# Patient Record
Sex: MALE | Race: BLACK OR AFRICAN AMERICAN | NOT HISPANIC OR LATINO | Employment: FULL TIME | URBAN - METROPOLITAN AREA
[De-identification: names, ages, dates, MRNs, and addresses within clinical notes are randomized per-mention and may not be internally consistent; named-entity substitution may affect disease eponyms.]

---

## 2019-08-19 ENCOUNTER — HOSPITAL ENCOUNTER (EMERGENCY)
Facility: OTHER | Age: 34
Discharge: HOME | End: 2019-08-19
Attending: EMERGENCY MEDICINE
Payer: COMMERCIAL

## 2019-08-19 VITALS
HEIGHT: 72 IN | SYSTOLIC BLOOD PRESSURE: 128 MMHG | RESPIRATION RATE: 19 BRPM | TEMPERATURE: 98 F | BODY MASS INDEX: 36.98 KG/M2 | OXYGEN SATURATION: 96 % | WEIGHT: 273 LBS | HEART RATE: 81 BPM | DIASTOLIC BLOOD PRESSURE: 75 MMHG

## 2019-08-19 DIAGNOSIS — M54.41 ACUTE LOW BACK PAIN WITH RIGHT-SIDED SCIATICA, UNSPECIFIED BACK PAIN LATERALITY: Primary | ICD-10-CM

## 2019-08-19 PROCEDURE — 99284 EMERGENCY DEPT VISIT MOD MDM: CPT | Mod: 25

## 2019-08-19 PROCEDURE — 96372 THER/PROPH/DIAG INJ SC/IM: CPT

## 2019-08-19 PROCEDURE — 63600175 PHARM REV CODE 636 W HCPCS: Performed by: PHYSICIAN ASSISTANT

## 2019-08-19 RX ORDER — HYDROMORPHONE HYDROCHLORIDE 1 MG/ML
1 INJECTION, SOLUTION INTRAMUSCULAR; INTRAVENOUS; SUBCUTANEOUS
Status: COMPLETED | OUTPATIENT
Start: 2019-08-19 | End: 2019-08-19

## 2019-08-19 RX ORDER — LIDOCAINE 50 MG/G
1 PATCH TOPICAL DAILY
Qty: 15 PATCH | Refills: 0 | Status: SHIPPED | OUTPATIENT
Start: 2019-08-19 | End: 2019-11-07

## 2019-08-19 RX ORDER — CYCLOBENZAPRINE HCL 10 MG
10 TABLET ORAL 3 TIMES DAILY PRN
Qty: 15 TABLET | Refills: 0 | Status: SHIPPED | OUTPATIENT
Start: 2019-08-19 | End: 2019-08-24

## 2019-08-19 RX ORDER — DICLOFENAC SODIUM 25 MG/1
25 TABLET, DELAYED RELEASE ORAL 3 TIMES DAILY PRN
Qty: 30 TABLET | Refills: 0 | Status: SHIPPED | OUTPATIENT
Start: 2019-08-19 | End: 2019-11-07

## 2019-08-19 RX ADMIN — HYDROMORPHONE HYDROCHLORIDE 1 MG: 1 INJECTION, SOLUTION INTRAMUSCULAR; INTRAVENOUS; SUBCUTANEOUS at 10:08

## 2019-08-19 NOTE — ED NOTES
Appearance: Pt awake, alert & oriented to person, place & time. Pt in no acute distress at present time. Pt is clean and well groomed with clothes appropriately fastened.   Skin: Skin warm, dry & intact. Color consistent with ethnicity. Mucous membranes moist. No breakdown or brusing noted.   Musculoskeletal: Patient moving all extremities well, no obvious swelling or deformities noted. No TTP to R upper and lower leg. Described pain as shooting pain to R posterior thigh radiates distally. Ambulatory with steady gait.   Respiratory: Respirations spontaneous, even, and non-labored. Visible chest rise noted. Airway is open and patent. No accessory muscle use noted.   Neurologic: Sensation is intact. Speech is clear and appropriate. Eyes open spontaneously, behavior appropriate to situation, follows commands,  purposeful motor response noted.   Cardiac:  No Bilateral lower extremity edema. Cap refill is <3 seconds. Pt denies active chest pains, SOB, dizziness, blurred vision, weakness or fatigue at this time.   Abdomen: Pt denies active abd pains, cramping or discomfort, No N/V/D at this time.   : Pt reports no dysuria or hematuria.

## 2019-08-19 NOTE — ED NOTES
Pt reporting pain is 5/10, reporting improvement after IM medication. Pt sitting upright in chair, wife by his side, playing on cell phone. Pt AAOx4 and appropriate at this time. Respirations even and unlabored. No acute distress noted.

## 2019-08-19 NOTE — ED NOTES
Pt with RLE pain x 2 weeks, was seen at urgent care, given tramadol, ibuprofen, steroids, denies relief of pain despite taking medications. Describes pain as shooting pain to R posterior thigh to R lateral lower leg below knee. Denies injury. Pt AAOx4 and appropriate at this time. Respirations even and unlabored. No acute distress noted.

## 2019-08-19 NOTE — ED PROVIDER NOTES
"Encounter Date: 8/19/2019       History     Chief Complaint   Patient presents with    Back Pain     Back pain radiating down the right leg two weeks ago. Pt was seen at  last week for the same s/s.      34-year-old male with no significant past medical history presents to the emergency department with complaints of lower back pain radiating down the right leg.  He denies any known trauma or injury but states that he does work as a .  He states that he may have strained something while messing around in his truck a couple of weeks ago.  Went to urgent care 1 week ago and received "a shot" and sent home with Tramadol, medrol dose pack, robaxin, and ibuprofen. States it has gotten worse. He reports pain that is 7/10.  He admits that he is taking all the medications as prescribed.  Denies loss of bowel bladder function or saddle paresthesias.  Denies any numbness or weakness.  Denies any urinary symptoms.    The history is provided by the patient and the spouse.     Review of patient's allergies indicates:  No Known Allergies  History reviewed. No pertinent past medical history.  Past Surgical History:   Procedure Laterality Date    MOUTH SURGERY       History reviewed. No pertinent family history.  Social History     Tobacco Use    Smoking status: Never Smoker    Smokeless tobacco: Never Used   Substance Use Topics    Alcohol use: Yes     Comment: occ    Drug use: Never     Comment: denies      Review of Systems   Constitutional: Negative for chills and fever.   HENT: Negative for sore throat.    Respiratory: Negative for shortness of breath.    Cardiovascular: Negative for chest pain.   Gastrointestinal: Negative for nausea and vomiting.   Genitourinary: Negative for difficulty urinating, dysuria, flank pain, frequency, hematuria and urgency.   Musculoskeletal: Positive for back pain. Negative for gait problem and joint swelling.        Right leg pain   Skin: Negative for rash.   Neurological: " Negative for weakness and numbness.   Hematological: Does not bruise/bleed easily.       Physical Exam     Initial Vitals [08/19/19 0904]   BP Pulse Resp Temp SpO2   (!) 159/86 84 18 99 °F (37.2 °C) 98 %      MAP       --         Physical Exam    Nursing note and vitals reviewed.  Constitutional: He appears well-developed and well-nourished. He is not diaphoretic.  Non-toxic appearance. No distress.   HENT:   Head: Normocephalic and atraumatic.   Right Ear: External ear normal.   Left Ear: External ear normal.   Nose: Nose normal.   Eyes: Conjunctivae and lids are normal. No scleral icterus.   Neck: Normal range of motion and phonation normal. Neck supple. No spinous process tenderness and no muscular tenderness present. Normal range of motion present. No neck rigidity.   Cardiovascular: Normal rate, regular rhythm, normal heart sounds, intact distal pulses and normal pulses. Exam reveals no gallop, no friction rub and no decreased pulses.    No murmur heard.  Pulses:       Radial pulses are 2+ on the right side, and 2+ on the left side.        Dorsalis pedis pulses are 2+ on the right side, and 2+ on the left side.   Pulmonary/Chest: Effort normal and breath sounds normal. No respiratory distress. He has no decreased breath sounds. He has no wheezes. He has no rhonchi. He has no rales.   Abdominal: Soft. Normal appearance. He exhibits no distension. There is no tenderness. There is no rebound and no guarding.   Musculoskeletal: Normal range of motion.   No obvious deformities, moving all extremities, normal gait  No midline TTP or step offs to cervical, thoracic or lumbar spine. No paraspinal muscle TTP. FROM of spine without discomfort or pain. No signs of trauma or injury.   Pain with modified straight leg raise bilaterally to the lower back.  Technically negative modified straight leg raise as it did not cause the pain to shoot down his leg.  No bony deformity or bony tenderness palpation. Full range of motion  of the knee and ankle joints. Intact distal pulses with no sensory deficits.  Capillary refill less than 3 sec.   Neurological: He is alert and oriented to person, place, and time. He has normal strength. He displays no atrophy. No sensory deficit. He exhibits normal muscle tone. Gait normal. GCS eye subscore is 4. GCS verbal subscore is 5. GCS motor subscore is 6.   Skin: Skin is warm, dry and intact. Capillary refill takes less than 2 seconds. No abrasion, no bruising, no ecchymosis, no laceration, no lesion, no rash and no abscess noted. No erythema.   Psychiatric: He has a normal mood and affect. His speech is normal and behavior is normal. Judgment normal. Cognition and memory are normal.         ED Course   Procedures  Labs Reviewed - No data to display       Imaging Results    None          Medical Decision Making:   History:   I obtained history from: someone other than patient.       <> Summary of History: spouse  Old Medical Records: I decided to obtain old medical records.  Initial Assessment:   34-year-old male with complaints consistent low back pain radiating down the right lower extremity.  Afebrile neurovascularly intact.  He is alert and healthy and nontoxic appearing.  He is not distressed.  No focal neurological deficits.  Exam documented above.  He denies any known trauma or injury and there are no clinical signs or symptoms of fracture, dislocation, subluxation, spinal cord compression or cauda equina syndrome.  I do not suspect epidural abscess at this time based on history and exam.  ED Management:  Patient was seen at urgent care and received prescriptions for tramadol, ibuprofen, Medrol Dosepak and Robaxin.  He also received an IM injection at time of that evaluation.  He reports no relief with these medications.  He was administered IM Dilaudid in the emergency department with some improvement in his pain.  Will discharge home with prescriptions for Flexeril, diclofenac and lidocaine  patches.  He is urged to follow up with primary care or return for any worsening signs or symptoms otherwise.  He states understanding and agrees with this plan.  This is the extent of patient's complaints today.  This note was created using [employer] dictation.  There may be typographical errors secondary to dictation.                        Clinical Impression:     1. Acute low back pain with right-sided sciatica, unspecified back pain laterality            Disposition:   Disposition: Discharged  Condition: Stable                        Tessie Gonzalez PA-C  08/19/19 1118

## 2019-08-19 NOTE — ED NOTES
OK for pt to wait in [facility] for pain relief after IM medication admin per REA ALFARO. Pt wife with him at this time. Will cont to monitor.

## 2019-08-29 ENCOUNTER — HOSPITAL ENCOUNTER (EMERGENCY)
Facility: OTHER | Age: 34
Discharge: HOME | End: 2019-08-29
Attending: EMERGENCY MEDICINE
Payer: COMMERCIAL

## 2019-08-29 VITALS
RESPIRATION RATE: 18 BRPM | HEART RATE: 88 BPM | DIASTOLIC BLOOD PRESSURE: 87 MMHG | SYSTOLIC BLOOD PRESSURE: 136 MMHG | HEIGHT: 72 IN | TEMPERATURE: 98 F | OXYGEN SATURATION: 97 % | BODY MASS INDEX: 36.57 KG/M2 | WEIGHT: 270 LBS

## 2019-08-29 DIAGNOSIS — M43.10 ANTEROLISTHESIS: ICD-10-CM

## 2019-08-29 DIAGNOSIS — M54.41 ACUTE RIGHT-SIDED LOW BACK PAIN WITH RIGHT-SIDED SCIATICA: Primary | ICD-10-CM

## 2019-08-29 LAB
BILIRUB UR QL STRIP: NEGATIVE
CLARITY UR: CLEAR
COLOR UR: YELLOW
GLUCOSE UR QL STRIP: NEGATIVE
HGB UR QL STRIP: NEGATIVE
KETONES UR QL STRIP: NEGATIVE
LEUKOCYTE ESTERASE UR QL STRIP: NEGATIVE
NITRITE UR QL STRIP: NEGATIVE
PH UR STRIP: 6 [PH] (ref 5–8)
PROT UR QL STRIP: NEGATIVE
SP GR UR STRIP: 1.02 (ref 1–1.03)
URN SPEC COLLECT METH UR: NORMAL
UROBILINOGEN UR STRIP-ACNC: NEGATIVE EU/DL

## 2019-08-29 PROCEDURE — 63600175 PHARM REV CODE 636 W HCPCS: Performed by: PHYSICIAN ASSISTANT

## 2019-08-29 PROCEDURE — 96372 THER/PROPH/DIAG INJ SC/IM: CPT

## 2019-08-29 PROCEDURE — 81003 URINALYSIS AUTO W/O SCOPE: CPT

## 2019-08-29 PROCEDURE — 25000003 PHARM REV CODE 250: Performed by: PHYSICIAN ASSISTANT

## 2019-08-29 PROCEDURE — 99284 EMERGENCY DEPT VISIT MOD MDM: CPT | Mod: 25

## 2019-08-29 RX ORDER — KETOROLAC TROMETHAMINE 30 MG/ML
30 INJECTION, SOLUTION INTRAMUSCULAR; INTRAVENOUS
Status: COMPLETED | OUTPATIENT
Start: 2019-08-29 | End: 2019-08-29

## 2019-08-29 RX ORDER — ACETAMINOPHEN 325 MG/1
650 TABLET ORAL
Status: COMPLETED | OUTPATIENT
Start: 2019-08-29 | End: 2019-08-29

## 2019-08-29 RX ORDER — TRIAMCINOLONE ACETONIDE 40 MG/ML
80 INJECTION, SUSPENSION INTRA-ARTICULAR; INTRAMUSCULAR
Status: COMPLETED | OUTPATIENT
Start: 2019-08-29 | End: 2019-08-29

## 2019-08-29 RX ADMIN — TRIAMCINOLONE ACETONIDE 80 MG: 40 INJECTION, SUSPENSION INTRA-ARTICULAR; INTRAMUSCULAR at 09:08

## 2019-08-29 RX ADMIN — ACETAMINOPHEN 650 MG: 325 TABLET, FILM COATED ORAL at 09:08

## 2019-08-29 RX ADMIN — KETOROLAC TROMETHAMINE 30 MG: 30 INJECTION, SOLUTION INTRAMUSCULAR at 09:08

## 2019-08-29 NOTE — ED PROVIDER NOTES
Encounter Date: 8/29/2019       History     Chief Complaint   Patient presents with    Sciatica     PT c/o right sided sciatic pain radiating to the RLE. Pt reports being diagnosed with sciatica, denies relief from ibuprofen, steriods & pain meds prescribed 2 weeks ago.     Patient is a 34-year-old male who presents to the emergency department with his wife for persistent back pain. Patient reports experiencing right sided lower back pain that radiates into his right buttocks and right posterior medial upper leg for the past 4-6 weeks.  He states he has been out of work for the past month secondary to the pain. 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  He he denies any injury. He has been seen twice for the symptoms (in the ED and urgent care) and was treated with pain medication, anti-inflammatories, Medrol Dosepak and muscle relaxer.  He states none of these medications have given him relief.  He states the pain improves with rest but will began to flare up after sitting for a prolonged period of time.  He states the pain is also present with lying down and he is unable to find comfort while sleeping.  He also states pain is worse with prolonged ambulation.  He denies fever, history of IV drug use, bowel or bladder incontinence, or saddle anesthesia.  He states he has not taken any over-the-counter analgesics over the past week.        Review of patient's allergies indicates:  No Known Allergies  Past Medical History:   Diagnosis Date    Sciatica      Past Surgical History:   Procedure Laterality Date    MOUTH SURGERY       History reviewed. No pertinent family history.  Social History     Tobacco Use    Smoking status: Never Smoker    Smokeless tobacco: Never Used   Substance Use Topics    Alcohol use: Yes     Comment: occ    Drug use: Never     Comment: denies      Review of Systems   Constitutional: Negative for chills and fever.   Gastrointestinal: Negative for nausea and vomiting.   Genitourinary: Negative for  difficulty urinating.   Musculoskeletal: Positive for back pain.        Right upper leg pain   Neurological: Negative for weakness and numbness.       Physical Exam     Initial Vitals [08/29/19 0840]   BP Pulse Resp Temp SpO2   (!) 140/93 102 18 98.8 °F (37.1 °C) 97 %      MAP       --         Physical Exam    Constitutional: Vital signs are normal. He is cooperative. No distress.   Eyes: EOM are normal.   Neck: Normal range of motion. Neck supple.   Cardiovascular: Normal rate, regular rhythm and intact distal pulses.   Pulmonary/Chest: Breath sounds normal. No respiratory distress.   Musculoskeletal:   No CT L midline tenderness, crepitus, masses, step-offs or deformities.  Straight leg raise on the right reproduces lower back and right upper leg pain, with negative straight leg raise distal to the right knee.   Neurological: He is alert and oriented to person, place, and time. GCS eye subscore is 4. GCS verbal subscore is 5. GCS motor subscore is 6.   Strength 5/5 to bilateral lower extremities. Normal sensation to light touch.   Skin: Skin is warm and dry. No rash noted.   Psychiatric: He has a normal mood and affect. His behavior is normal.         ED Course   Procedures  Labs Reviewed - No data to display       Imaging Results    None          Medical Decision Making:   Initial Assessment:   Urgent evaluation of a 34 y.o. male presenting to the emergency department complaining of right lower back pain radiating into right buttocks and right upper leg for the past 4-6 weeks, causing disability at work. Patient is afebrile, nontoxic appearing and hemodynamically stable.  Differential includes sciatica, herniated disc, spondylolysis, mechanical back pain.  There are no signs of saddle anesthesia, incontinence, neurologic deficits, fevers, trauma or midline tenderness on history or physical to suggest cauda equina, infectious process, fracture or subluxation.   Given chronicity of patient's back pain, will obtain  lumbar x-ray.  Patient will be given IM Toradol and IM Kenalog.  ED Management:  Urinalysis is normal.  Bur spine x-ray reveals grade 1 anteriorlisthesis of L4 on L5 with mild disc space narrowing.   Patient reports relief with medications given here in ED.   He is given information to [facility].                      Clinical Impression:     1. Acute right-sided low back pain with right-sided sciatica    2. Anterolisthesis                               Dejan Meek PA-C  08/29/19 115

## 2019-11-07 ENCOUNTER — OFFICE VISIT (OUTPATIENT)
Dept: INTERNAL MEDICINE | Facility: CLINIC | Age: 34
End: 2019-11-07
Payer: COMMERCIAL

## 2019-11-07 VITALS
DIASTOLIC BLOOD PRESSURE: 90 MMHG | WEIGHT: 268.31 LBS | BODY MASS INDEX: 36.34 KG/M2 | HEIGHT: 72 IN | SYSTOLIC BLOOD PRESSURE: 150 MMHG | HEART RATE: 106 BPM

## 2019-11-07 DIAGNOSIS — M54.41 CHRONIC RIGHT-SIDED LOW BACK PAIN WITH RIGHT-SIDED SCIATICA: Primary | ICD-10-CM

## 2019-11-07 DIAGNOSIS — G89.29 CHRONIC RIGHT-SIDED LOW BACK PAIN WITH RIGHT-SIDED SCIATICA: Primary | ICD-10-CM

## 2019-11-07 DIAGNOSIS — R03.0 ELEVATED BLOOD PRESSURE READING WITHOUT DIAGNOSIS OF HYPERTENSION: ICD-10-CM

## 2019-11-07 PROCEDURE — 3008F BODY MASS INDEX DOCD: CPT | Mod: CPTII,S$GLB,, | Performed by: FAMILY MEDICINE

## 2019-11-07 PROCEDURE — 99214 OFFICE O/P EST MOD 30 MIN: CPT | Mod: S$GLB,,, | Performed by: FAMILY MEDICINE

## 2019-11-07 PROCEDURE — 3008F PR BODY MASS INDEX (BMI) DOCUMENTED: ICD-10-PCS | Mod: CPTII,S$GLB,, | Performed by: FAMILY MEDICINE

## 2019-11-07 PROCEDURE — 99999 PR PBB SHADOW E&M-EST. PATIENT-LVL IV: CPT | Mod: PBBFAC,,, | Performed by: FAMILY MEDICINE

## 2019-11-07 PROCEDURE — 99999 PR PBB SHADOW E&M-EST. PATIENT-LVL IV: ICD-10-PCS | Mod: PBBFAC,,, | Performed by: FAMILY MEDICINE

## 2019-11-07 PROCEDURE — 99214 PR OFFICE/OUTPT VISIT, EST, LEVL IV, 30-39 MIN: ICD-10-PCS | Mod: S$GLB,,, | Performed by: FAMILY MEDICINE

## 2019-11-07 RX ORDER — MELOXICAM 7.5 MG/1
TABLET ORAL
COMMUNITY
Start: 2019-11-04 | End: 2020-02-21 | Stop reason: SDUPTHER

## 2019-11-07 RX ORDER — TIZANIDINE 2 MG/1
TABLET ORAL
COMMUNITY
Start: 2019-11-04 | End: 2020-02-21 | Stop reason: SDUPTHER

## 2019-11-07 NOTE — PATIENT INSTRUCTIONS
Exercises to Strengthen Your Lower Back  Strong lower back and abdominal muscles work together to support your spine. The exercises below will help strengthen the lower back. It is important that you begin exercising slowly and increase levels gradually.  Always begin any exercise program with stretching. If you feel pain while doing any of these exercises, stop and talk to your doctor about a more specific exercise program that better suits your condition.   Low back stretch  The point of stretching is to make you more flexible and increase your range of motion. Stretch only as much as you are able. Stretch slowly. Do not push your stretch to the limit. If at any point you feel pain while stretching, this is your (temporary) limit.  · Lie on your back with your knees bent and both feet on the ground.  · Slowly raise your left knee to your chest as you flatten your lower back against the floor. Hold for 5 seconds.  · Relax and repeat the exercise with your right knee.  · Do 10 of these exercises for each leg.  · Repeat hugging both knees to your chest at the same time.  Building lower back strength  Start your exercise routine with 10 to 30 minutes a day, 1 to 3 times a day.  Initial exercises  Lying on your back:  1. Ankle pumps: Move your foot up and down, towards your head, and then away. Repeat 10 times with each foot.  2. Heel slides: Slowly bend your knee, drawing the heel of your foot towards you. Then slide your heel/foot from you, straightening your knee. Do not lift your foot off the floor (this is not a leg lift).  3. Abdominal contraction: Bend your knees and put your hands on your stomach. Tighten your stomach muscles. Hold for 5 seconds, then relax. Repeat 10 times.  4. Straight leg raise: Bend one leg at the knee and keep the other leg straight. Tighten your stomach muscles. Slowly lift your straight leg 6 to 12 inches off the floor and hold for up to 5 seconds. Repeat 10 times on each  side.  Standin. Wall squats: Stand with your back against the wall. Move your feet about 12 inches away from the wall. Tighten your stomach muscles, and slowly bend your knees until they are at about a 45 degree angle. Do not go down too far. Hold about 5 seconds. Then slowly return to your starting position. Repeat 10 times.  2. Heel raises: Stand facing the wall. Slowly raise the heels of your feet up and down, while keeping your toes on the floor. If you have trouble balancing, you can touch the wall with your hands. Repeat 10 times.  More advanced exercises  When you feel comfortable enough, try these exercises.  1. Kneeling lumbar extension: Begin on your hands and knees. At the same time, raise and straighten your right arm and left leg until they are parallel to the ground. Hold for 2 seconds and come back slowly to a starting position. Repeat with left arm and right leg, alternating 10 times.  2. Prone lumbar extension: Lie face down, arms extended overhead, palms on the floor. At the same time, raise your right arm and left leg as high as comfortably possible. Hold for 10 seconds and slowly return to start. Repeat with left arm and right leg, alternating 10 times. Gradually build up to 20 times. (Advanced: Repeat this exercise raising both arms and both legs a few inches off the floor at the same time. Hold for 5 seconds and release.)  3. Pelvic tilt: Lie on the floor on your back with your knees bent at 90 degrees. Your feet should be flat on the floor. Inhale, exhale, then slowly contract your abdominal muscles bringing your navel toward your spine. Let your pelvis rock back until your lower back is flat on the floor. Hold for 10 seconds while breathing smoothly.  4. Abdominal crunch: Perform a pelvic tilt (above) flattening your lower back against the floor. Holding the tension in your abdominal muscles, take another breath and raise your shoulder blades off the ground (this is not a full sit-up).  Keep your head in line with your body (dont bend your neck forward). Hold for 2 seconds, then slowly lower.  Date Last Reviewed: 6/1/2016 © 2000-2017 [employer]. [address] [place] [address], [place] [address]. All rights reserved. This information is not intended as a substitute for professional medical care. Always follow your healthcare professional's instructions.        Self-Care for Low Back Pain    Most people have low back pain now and then. In many cases, it isnt serious and self-care can help. Sometimes low back pain can be a sign of a bigger problem. Call your healthcare provider if your pain returns often or gets worse over time. For the long-term care of your back, get regular exercise, lose any excess weight and learn good posture.  Take a short rest  Lying down during the day may be beneficial for short periods of time if severe pain increases with sitting or standing. Long-term bed rest could be detrimental.  Reduce pain and swelling  Cold reduces swelling. Both cold and heat can reduce pain. Protect your skin by placing a towel between your body and the ice or heat source.  · For the first few days, apply an ice pack for 15 to 20 minutes .  · After the first few days, try heat for 15 minutes at a time to ease pain. Never sleep on a heating pad.  · Over-the-counter medicine can help control pain and swelling. Try aspirin or ibuprofen.  Exercise  Exercise can help your back heal. It also helps your back get stronger and more flexible, preventing any reinjury. Ask your healthcare provider about specific exercises for your back.  Use good posture to avoid reinjury  · When moving, bend at the hips and knees. Dont bend at the waist or twist around.  · When lifting, keep the object close to your body. Dont try to lift more than you can handle.  · When sitting, keep your lower back supported. Use a rolled-up towel as needed.  Seek immediate medical care if:  · Youre unable to stand or  walk.  · You have a temperature over 100.4°F (38.0°C)  · You have frequent, painful, or bloody urination.  · You have severe abdominal pain.  · You have a sharp, stabbing pain.  · Your pain is constant.  · You have pain or numbness in your leg.  · You feel pain in a new area of your back.  · You notice that the pain isnt decreasing after more than a week.   Date Last Reviewed: 9/29/2015 © 2000-2017 [employer]. [address] [place] [address], [place] [address]. All rights reserved. This information is not intended as a substitute for professional medical care. Always follow your healthcare professional's instructions.

## 2019-11-07 NOTE — PROGRESS NOTES
Subjective:       Patient ID: Carlos Flores is a 34 y.o. male.    Chief Complaint: Establish Care (sciatic nerve pain)    HPI    34yoM to est care.     #Elevated bp wo dx htn  - likely s/t pain currently, will cont to monitor.    #Back pain -- lower back comes down right side x 4-5 months, has never had before, denies red flag symptoms. No acute changes, persistent level of pain. Worse with extended times in any position. Works as  and gives him issues when sitting for too long. Also sleep disturbances with certain positions.  - has tried antiinflammatories, medrol dose shashi, spasm relief prn.  - was seen in ED 8/2019 with similar complaint. Xray lumbar spine showing anterolisthesis of L4 on L5, likely degenerative, Was given IM toradol and IM kenolog.  - saw [facility] 2 days ago and started on mobic and zanaflex at that time, does bring relief when takes but comes back    #Snoring, apneic eps, daytime somnolence  - wants to address next visit    #Chronic allergic rhinitis / congestion  - wants to address next visit    Review of Systems   Constitutional: Negative for fatigue and fever.   HENT: Positive for congestion. Negative for sinus pain and sore throat.    Eyes: Negative for pain and visual disturbance.   Respiratory: Negative for cough and shortness of breath.    Cardiovascular: Negative for chest pain and palpitations.   Gastrointestinal: Negative for abdominal pain, constipation, diarrhea, nausea and vomiting.   Endocrine: Negative for cold intolerance and heat intolerance.   Genitourinary: Negative for dysuria.   Musculoskeletal: Positive for back pain.   Skin: Negative for rash.   Neurological: Negative for weakness, numbness and headaches.   Psychiatric/Behavioral: Positive for sleep disturbance. Negative for confusion. The patient is not nervous/anxious.          Past Medical History:   Diagnosis Date    Sciatica         Prior to Admission medications    Medication Sig  Start Date End Date Taking? Authorizing Provider   diclofenac (VOLTAREN) 25 MG TbEC Take 1 tablet (25 mg total) by mouth 3 (three) times daily as needed (pain). 8/19/19   Tessie Gonzalez PA-C   lidocaine (LIDODERM) 5 % Place 1 patch onto the skin once daily. Remove & Discard patch within 12 hours or as directed by MD 8/19/19   Tessie Gonzalez PA-C        Past medical history, surgical history, and family medical history reviewed and updated as appropriate.    Medications and allergies reviewed.     Objective:          Vitals:    11/07/19 1517   BP: (!) 150/90   BP Location: Right arm   Patient Position: Sitting   BP Method: Medium (Manual)   Pulse: 106   Weight: 121.7 kg (268 lb 4.8 oz)   Height: 6' (1.829 m)     Body mass index is 36.39 kg/m².  Physical Exam   Constitutional: He is oriented to person, place, and time. He appears well-developed and well-nourished.   Eyes: Pupils are equal, round, and reactive to light. EOM are normal.   Cardiovascular: Normal rate, regular rhythm and normal heart sounds.   No murmur heard.  Pulmonary/Chest: Effort normal and breath sounds normal. No respiratory distress. He has no wheezes.   Abdominal: Soft. Bowel sounds are normal. He exhibits no distension. There is no tenderness.   Musculoskeletal:        Lumbar back: He exhibits tenderness, pain and spasm. He exhibits normal range of motion, no edema and no deformity.   Pain upon palpation right lumbar area, pain provoked with flexion and extension, negative with lateral mvmt.   Neurological: He is alert and oriented to person, place, and time.       No results found for: WBC, HGB, HCT, PLT, CHOL, TRIG, HDL, LDLDIRECT, ALT, AST, NA, K, CL, CREATININE, BUN, CO2, TSH, PSA, INR, GLUF, HGBA1C, MICROALBUR    Assessment:       1. Chronic right-sided low back pain with right-sided sciatica    2. Elevated blood pressure reading without diagnosis of hypertension        Plan:   Carlos was seen today for establish care.    Diagnoses  and all orders for this visit:    Elevated blood pressure reading without diagnosis of hypertension  - cont to monitor next visit    Chronic right-sided low back pain with right-sided sciatica  -     Ambulatory Referral to Physical/Occupational Therapy  - cont antiinflammatory and muscle relaxer as needed. Will continue trial of conservative therapy with phys therapy ordered, complete course and come back for follow up visit. Discussed home exercises at length today and will start this course as well.      Follow up in about 2 months (around 1/7/2020).    Ramo Howard MD  Family Medicine  [facility]  11/7/2019

## 2019-11-22 ENCOUNTER — CLINICAL SUPPORT (OUTPATIENT)
Dept: REHABILITATION | Facility: HOSPITAL | Age: 34
End: 2019-11-22
Attending: FAMILY MEDICINE
Payer: COMMERCIAL

## 2019-11-22 DIAGNOSIS — M54.16 CHRONIC RADICULAR LOW BACK PAIN: ICD-10-CM

## 2019-11-22 DIAGNOSIS — G89.29 CHRONIC RADICULAR LOW BACK PAIN: ICD-10-CM

## 2019-11-22 DIAGNOSIS — M25.60 RANGE OF MOTION DEFICIT: ICD-10-CM

## 2019-11-22 PROCEDURE — 97110 THERAPEUTIC EXERCISES: CPT

## 2019-11-22 PROCEDURE — 97161 PT EVAL LOW COMPLEX 20 MIN: CPT

## 2019-11-22 NOTE — PROGRESS NOTES
"[facility]  Physical Therapy Initial Evaluation    Name: Carlos Flores  Clinic Number: [id number]    Therapy Diagnosis:   Encounter Diagnoses   Name Primary?    Chronic radicular low back pain     Range of motion deficit      Physician: Ramo Howard MD    Physician Orders: PT Eval and Treat   Medical Diagnosis from Referral: M54.41,G89.29 (ICD-10-CM) - Chronic right-sided low back pain with right-sided sciatica  Evaluation Date: 11/22/2019  Authorization Period Expiration: 12/31/2019  Plan of Care Expiration: 1/17/2020  Visit # / Visits authorized: 1/ 20    Time In: 3:00  Time Out: 4:00  Total Billable Time: 60 minutes    Precautions: Standard    Subjective   Date of onset: Mid-July - no MESHA.   History of current condition - Carlos reports: started having "minor back problems"  Early August it got "really bad" - he went to the MD and he could barely walk back to the car and was in severe pain laying in bed. He was barely able to walk for 1.5 month. He went back to the doctor, was given multiple steroid injection (early September for first injection, a few weeks later for the second injection), he noted significant relief after each shot for about 2 weeks. He now takes an anti-inflammatory by mouth every day.     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 No history of back problems before now.        Past Medical History:   Diagnosis Date    Sciatica      Carlos Flores  has a past surgical history that includes Mouth surgery.    Carlos has a current medication list which includes the following prescription(s): meloxicam and tizanidine.    Review of patient's allergies indicates:  No Known Allergies     Imaging, X-ray:   FINDINGS:  There is straightening of the normal lumbar lordosis.  There is levo convexity of the lumbar spine.  There is 1-2 mm anterolisthesis of L4 on L5 with mild disc space narrowing at the L4-5 level. "  Mild disc space narrowing is noted at the L4-L5 level.  No abnormal paraspinal masses are identified.  Sacroiliac joints are unremarkable.      Impression       Minimal grade 1 anterolisthesis of L4 on L5, likely degenerative in etiology with mild disc space narrowing.    Levo convexity of the lumbar spine.         Prior Therapy: None  Social History: He lives with their family in a one-story home   Occupation:   Prior Level of Function: independent  Current Level of Function: pain with prolonged sitting; pain with flexion activities.     Pain:  Current 0/10, worst 10/10 in August when he went to the ER, 4-5/10 over the last 3 weeks, best 0/10   Location: right glute/PSIS down to knee (pt states pain does not go past the knee; a few months ago he would feel numbness into the lateral aspect of his calf).   Description: sharp/shooting pain in to leg; sometimes feels like burning   Deep/achey pain felt in lower back with prolonged sitting (sitting> an hour).   Aggravating Factors: Sitting; bending forward causes pain into posterior thigh  Easing Factors: pain medication and rest    Pts goals: decrease pain     Objective     Observation: Pt is stated age, pleasant, no acute distress, oriented x3.   Gait: normal, efficient     Palpation: (+) symptom provocation with PA pressure over (R) SIJ    Lumbar Range of Motion  Flex: 20% AROM with pain and symptom reproduction  Ext: 50%, mild pain and mild symptoms  RR: 70%  LR: 70%      Joint Mobility (PA springing): symptom provocation over (R) SIJ        Strength   Hip Left Right   Abduction 4/5 4/5       Knee Left Right   Extension 5/5 5/5   Flexion 5/5 5/5       Special Tests:   (+) crossed slump test  (+) slump test   (+) SLR      CMS Impairment/Limitation/Restriction for FOTO Lumbar Survey    Therapist reviewed FOTO scores for Carlos Flores on 11/22/2019.   FOTO documents entered into [employer] - see Media section.    Limitation Score: 53%  Category: Body  Position    Current : CK = at least 40% but < 60% impaired, limited or restricted  Goal: CJ = at least 20% but < 40% impaired, limited or restricted         TREATMENT   Treatment Time In: 3:45  Treatment Time Out: 4:00  Total Treatment time separate from Evaluation: 15 minutes    Carlos received therapeutic exercises to develop ROM and decreased neural tension for 15 minutes including:  - quadruped rocking - 2x15, 2x/day for HEP  - mini squats 10x, 2x/day for HEP    Home Exercises and Patient Education Provided    Education provided:   - Findings of evaluation, prognosis, PT plan of care, HEP    Written Home Exercises Provided: see Ther Ex section above.  Exercises were reviewed and Carlos was able to demonstrate them prior to the end of the session.  Carlos demonstrated good  understanding of the education provided.     See EMR under Ther Ex section above for exercises provided 11/22/2019.    Assessment   Carlos is a 34 y.o. male referred to outpatient Physical Therapy with a medical diagnosis of M54.41,G89.29 (ICD-10-CM) - Chronic right-sided low back pain with right-sided sciatica. Pt presents with decreased ROM and strength, signs/symptoms of neural tissue irritation, leading to pain with prolonged sitting and standing.     Pt prognosis is Good.   Pt will benefit from skilled outpatient Physical Therapy to address the deficits stated above and in the chart below, provide pt/family education, and to maximize pt's level of independence.     Plan of care discussed with patient: Yes  Pt's spiritual, cultural and educational needs considered and patient is agreeable to the plan of care and goals as stated below:     Anticipated Barriers for therapy: None    Medical Necessity is demonstrated by the following  History  Co-morbidities and personal factors that may impact the plan of care Co-morbidities:   high BMI    Personal Factors:   age  lifestyle     low   Examination  Body Structures and Functions, activity limitations and  participation restrictions that may impact the plan of care Body Regions:   back  lower extremities  trunk    Body Systems:    gross symmetry  ROM  strength  gross coordinated movement  transfers  motor control    Participation Restrictions:   Prolonged sitting/standing, transfers, lifting/carrying    Activity limitations:   Learning and applying knowledge  no deficits    General Tasks and Commands  no deficits    Communication  no deficits    Mobility  Prolonged sitting/standing, transfers, lifting/carrying    Self care  no deficits    Domestic Life  shopping  cooking  doing house work (cleaning house, washing dishes, laundry)  assisting others    Interactions/Relationships  no deficits    Life Areas  employment  basic economic transactions    Community and Social Life  community life  recreation and leisure         moderate   Clinical Presentation stable and uncomplicated low   Decision Making/ Complexity Score: low     Goals:  Short Term Goals: 2 weeks   - Pt will demonstrate excellent knowledge and adherence to HEP to facilitate optimal recovery.      Long Term Goals: 8 weeks   - Pt will demonstrate lumbar AROM WFL without radicular symptoms for increased mobility tolerance   - Pt will report at least 50% increased sitting tolerance for decreased symptoms with work-related tasks   - Pt will demonstrate (B) hip strength of 5/5 MMT for increased stability needed for functional activity   - Pt will demonstrate negative findings with slump, crossed-slump, and SLR tests indicated decreased irritability of tissue.      Plan   Plan of care Certification: 11/22/2019 to 1/17/2020.    Outpatient Physical Therapy 1-2 times weekly for 8 weeks to include the following interventions: Gait Training, Manual Therapy, Moist Heat/ Ice, Neuromuscular Re-ed, Patient Education, Therapeutic Activites and Therapeutic Exercise.     Susan Pimentel, PT, DPT

## 2019-11-29 PROBLEM — G89.29 CHRONIC RADICULAR LOW BACK PAIN: Status: ACTIVE | Noted: 2019-11-29

## 2019-11-29 PROBLEM — M25.60 RANGE OF MOTION DEFICIT: Status: ACTIVE | Noted: 2019-11-29

## 2019-11-29 PROBLEM — M54.16 CHRONIC RADICULAR LOW BACK PAIN: Status: ACTIVE | Noted: 2019-11-29

## 2019-12-02 ENCOUNTER — CLINICAL SUPPORT (OUTPATIENT)
Dept: REHABILITATION | Facility: HOSPITAL | Age: 34
End: 2019-12-02
Attending: FAMILY MEDICINE
Payer: COMMERCIAL

## 2019-12-02 DIAGNOSIS — M25.60 RANGE OF MOTION DEFICIT: ICD-10-CM

## 2019-12-02 DIAGNOSIS — G89.29 CHRONIC RADICULAR LOW BACK PAIN: ICD-10-CM

## 2019-12-02 DIAGNOSIS — M54.16 CHRONIC RADICULAR LOW BACK PAIN: ICD-10-CM

## 2019-12-02 PROCEDURE — 97110 THERAPEUTIC EXERCISES: CPT

## 2019-12-02 PROCEDURE — 97112 NEUROMUSCULAR REEDUCATION: CPT

## 2019-12-02 NOTE — PROGRESS NOTES
Physical Therapy Daily Treatment Note     Name: Carlos Flores  [facility] Number: [id number]    Therapy Diagnosis:   Encounter Diagnoses   Name Primary?    Chronic radicular low back pain     Range of motion deficit      Physician: Ramo Howard MD    Visit Date: 12/2/2019    Physician Orders: PT Eval and Treat   Medical Diagnosis from Referral: M54.41,G89.29 (ICD-10-CM) - Chronic right-sided low back pain with right-sided sciatica  Evaluation Date: 11/22/2019  Authorization Period Expiration: 12/31/2019  Plan of Care Expiration: 1/17/2020  Visit # / Visits authorized: 2/ 20     Time In: 3:00  Time Out: 4:00  Total Billable Time: 60 minutes     Precautions: Standard    Subjective     Pt reports: he's been feeling well overall, pain has been 3-4/10 at worst since last session. He's been doing HEP - he felt mild symptoms with mini squats so he stopped those, but quadruped rocking is ok.   He was compliant with home exercise program.  Response to previous treatment: Tolerated well  Functional change: NA    Pain: 0/10  Location: right back      Objective     Carlos received therapeutic exercises to develop strength, endurance for 30 minutes including:  - elliptical 8 min timed   - quadruped rocking 2x20   - SL hip abduction 3 sets to fatigue (B)  - supine bridging c adductor ball squeeze 3x12  - mini squats 2x10       Carlos participated in neuromuscular re-education activities to improve: Coordination, Posture and Core Stabilization for 23 minutes. The following activities were included:  - GTB multifidus walk-outs 30x (B) - added to HEP  - GTB multifidus press-outs 30x (B) - added to HEP      Home Exercises Provided and Patient Education Provided     Education provided:   - exercise rationale     Home Exercises Provided: 12/2/2019:    - mini squats 2x10  - quadruped rocking 2x20   - GTB multifidus anti-rotation walk-outs and press-outs 30x (B) each     Exercises were reviewed and Carlos was able to demonstrate them prior to the  end of the session.  Carlos demonstrated good  understanding of the education provided.       Assessment     Pt tolerated treatment well overall. Critiqued form with mini squats, no symptoms produced. Initiated multifidus anti-rotation press-outs and walk-outs, added to HEP. Mild symptoms reported with SL hip abduction to fatigue. Patient reported feeling well upon departure.     Carlos is progressing well towards his goals.   Pt prognosis is Good.     Pt will continue to benefit from skilled outpatient physical therapy to address the deficits listed in the problem list box on initial evaluation, provide pt/family education and to maximize pt's level of independence in the home and community environment.     Pt's spiritual, cultural and educational needs considered and pt agreeable to plan of care and goals.    Anticipated barriers to physical therapy: None    Goals:  Short Term Goals: 2 weeks   - Pt will demonstrate excellent knowledge and adherence to HEP to facilitate optimal recovery.        Long Term Goals: 8 weeks   - Pt will demonstrate lumbar AROM WFL without radicular symptoms for increased mobility tolerance   - Pt will report at least 50% increased sitting tolerance for decreased symptoms with work-related tasks   - Pt will demonstrate (B) hip strength of 5/5 MMT for increased stability needed for functional activity   - Pt will demonstrate negative findings with slump, crossed-slump, and SLR tests indicated decreased irritability of tissue.      Plan     Continue per POC.     Susan Pimentel, PT , DPT

## 2019-12-23 ENCOUNTER — CLINICAL SUPPORT (OUTPATIENT)
Dept: REHABILITATION | Facility: HOSPITAL | Age: 34
End: 2019-12-23
Attending: FAMILY MEDICINE
Payer: COMMERCIAL

## 2019-12-23 DIAGNOSIS — G89.29 CHRONIC RADICULAR LOW BACK PAIN: ICD-10-CM

## 2019-12-23 DIAGNOSIS — M54.16 CHRONIC RADICULAR LOW BACK PAIN: ICD-10-CM

## 2019-12-23 DIAGNOSIS — M25.60 RANGE OF MOTION DEFICIT: ICD-10-CM

## 2019-12-23 PROCEDURE — 97110 THERAPEUTIC EXERCISES: CPT

## 2019-12-23 PROCEDURE — 97112 NEUROMUSCULAR REEDUCATION: CPT

## 2019-12-23 NOTE — PROGRESS NOTES
Physical Therapy Daily Treatment Note     Name: Carlos Flores  [facility] Number: [id number]    Therapy Diagnosis:   Encounter Diagnoses   Name Primary?    Chronic radicular low back pain     Range of motion deficit      Physician: Ramo Howard MD    Visit Date: 12/23/2019    Physician Orders: PT Eval and Treat   Medical Diagnosis from Referral: M54.41,G89.29 (ICD-10-CM) - Chronic right-sided low back pain with right-sided sciatica  Evaluation Date: 11/22/2019  Authorization Period Expiration: 12/31/2019  Plan of Care Expiration: 1/17/2020  Visit # / Visits authorized: 3/ 20     Time In: 3:05  Time Out: 4:00  Total Billable Time: 55 minutes     Precautions: Standard    Subjective     Pt reports: he's been feeling well overall, pain has been 3-4/10 at worst since last session. He's been doing HEP. He did stop taking the anti-inflammatory and he's noted increased pain with prolonged walking; sitting at work is ok.   He was compliant with home exercise program.  Response to previous treatment: Tolerated well  Functional change: NA    Pain: 0/10  Location: right back      Objective     Carlos received therapeutic exercises to develop strength, endurance for 23 minutes including:  - elliptical 10 min timed   - quadruped rocking 2x20   - SL hip abduction 3 sets to fatigue (B)  - DL leg press 120# 40x  - supine bridging c adductor ball squeeze 3x12 -NP  - mini squats 2x10 -NP      Carlos participated in neuromuscular re-education activities to improve: Coordination, Posture and Core Stabilization for 23 minutes. The following activities were included:  - BTB multifidus walk-outs 30x (B) - added to HEP  - BTB multifidus press-outs 30x (B) - added to HEP      Home Exercises Provided and Patient Education Provided     Education provided:   - exercise rationale     Home Exercises Provided: 12/2/2019:    - mini squats 2x10  - quadruped rocking 2x20   - GTB multifidus anti-rotation walk-outs and press-outs 30x (B) each     Exercises  were reviewed and Carlos was able to demonstrate them prior to the end of the session.  Carlos demonstrated good  understanding of the education provided.       Assessment     Pt tolerated treatment well overall. Continued multifidus anti-rotation press-outs and walk-outs, added to HEP. Novsymptoms reported with SL hip abduction to fatigue. Patient reported feeling well upon departure.     Carlos is progressing well towards his goals.   Pt prognosis is Good.     Pt will continue to benefit from skilled outpatient physical therapy to address the deficits listed in the problem list box on initial evaluation, provide pt/family education and to maximize pt's level of independence in the home and community environment.     Pt's spiritual, cultural and educational needs considered and pt agreeable to plan of care and goals.    Anticipated barriers to physical therapy: None    Goals:  Short Term Goals: 2 weeks   - Pt will demonstrate excellent knowledge and adherence to HEP to facilitate optimal recovery.        Long Term Goals: 8 weeks   - Pt will demonstrate lumbar AROM WFL without radicular symptoms for increased mobility tolerance   - Pt will report at least 50% increased sitting tolerance for decreased symptoms with work-related tasks   - Pt will demonstrate (B) hip strength of 5/5 MMT for increased stability needed for functional activity   - Pt will demonstrate negative findings with slump, crossed-slump, and SLR tests indicated decreased irritability of tissue.      Plan     Continue per POC.     Susan Pimentel, PT , DPT

## 2019-12-25 ENCOUNTER — PATIENT MESSAGE (OUTPATIENT)
Dept: REHABILITATION | Facility: HOSPITAL | Age: 34
End: 2019-12-25

## 2020-02-11 ENCOUNTER — PATIENT MESSAGE (OUTPATIENT)
Dept: INTERNAL MEDICINE | Facility: CLINIC | Age: 35
End: 2020-02-11

## 2020-02-11 DIAGNOSIS — M54.41 CHRONIC RIGHT-SIDED LOW BACK PAIN WITH RIGHT-SIDED SCIATICA: Primary | ICD-10-CM

## 2020-02-11 DIAGNOSIS — G89.29 CHRONIC RIGHT-SIDED LOW BACK PAIN WITH RIGHT-SIDED SCIATICA: Primary | ICD-10-CM

## 2020-02-11 NOTE — TELEPHONE ENCOUNTER
I placed referral to back and spine clinic and then they will decide on further imaging and potential need for injections. Please help schedule.

## 2020-02-20 ENCOUNTER — TELEPHONE (OUTPATIENT)
Dept: SPINE | Facility: CLINIC | Age: 35
End: 2020-02-20

## 2020-02-20 NOTE — PROGRESS NOTES
Subjective:      Patient ID: Carlos Flores is a 35 y.o. male.    Chief Complaint: Low-back Pain and Leg Pain    Mr Flores is a 34 yo male sent in consultation by Dr. Howard for evaluation of right low back pain.  He has had low back pain since the end of July 2019.  There was no event that started the pain.  The pain is wrose in the back of the right leg from the top of the butt to the knee.  The pain is not constant.  It will go away but little moves will bring it back.  The worst is changing positions getting up and down from the chair.  When first stands he has to stand still and then gets better and he can walk.  The pain is a sharp shooting pain.  Pain is 3/10 now, worst 7/10 getting out of bed in morning, best 0/10 sitting still.  He went to 3 PT visits, hard to make appointments with work.  Chiropractor has not gone.  He took tizanidine and mobic and those helped but he does not like meds.      8/29/2019  Lumbar spine x-ray  There is straightening of the normal lumbar lordosis.  There is levo convexity of the lumbar spine.  There is 1-2 mm anterolisthesis of L4 on L5 with mild disc space narrowing at the L4-5 level.  Mild disc space narrowing is noted at the L4-L5 level.  No abnormal paraspinal masses are identified.  Sacroiliac joints are unremarkable.    Impression      Minimal grade 1 anterolisthesis of L4 on L5, likely degenerative in etiology with mild disc space narrowing.    Levo convexity of the lumbar spine.      Past Medical History:  No date: Sciatica    Past Surgical History:  No date: MOUTH SURGERY    No family history on file.      Social History    Socioeconomic History      Marital status:       Spouse name: Not on file      Number of children: Not on file      Years of education: Not on file      Highest education level: Not on file    Occupational History      Not on file    Social Needs      Financial resource strain: Not on file      Food insecurity:        Worry: Not on file         Inability: Not on file      Transportation needs:        Medical: Not on file        Non-medical: Not on file    Tobacco Use      Smoking status: Never Smoker      Smokeless tobacco: Never Used    Substance and Sexual Activity      Alcohol use: Yes        Comment: occ      Drug use: Never        Comment: denies       Sexual activity: Not on file    Lifestyle      Physical activity:        Days per week: Not on file        Minutes per session: Not on file      Stress: Not on file    Relationships      Social connections:        Talks on phone: Not on file        Gets together: Not on file        Attends Mosque service: Not on file        Active member of club or organization: Not on file        Attends meetings of clubs or organizations: Not on file        Relationship status: Not on file    Other Topics      Concerns:        Not on file    Social History Narrative      Not on file      Current Outpatient Medications:  meloxicam (MOBIC) 7.5 MG tablet, , Disp: , Rfl:   tiZANidine (ZANAFLEX) 2 MG tablet, , Disp: , Rfl:     No current facility-administered medications for this visit.       Review of patient's allergies indicates:  No Known Allergies        Review of Systems   Constitution: Negative for weight gain and weight loss.   Cardiovascular: Negative for chest pain.   Respiratory: Negative for shortness of breath.    Musculoskeletal: Positive for back pain (right upper thigh). Negative for joint pain and joint swelling.   Gastrointestinal: Negative for abdominal pain, bowel incontinence, nausea and vomiting.   Genitourinary: Negative for bladder incontinence.   Neurological: Positive for numbness (rare numbness upper calf). Negative for paresthesias.         Objective:        General: Carlos is well-developed, well-nourished, appears stated age, in no acute distress, alert and oriented to time, place and person.     General    Vitals reviewed.  Constitutional: He is oriented to person, place, and time. He appears  well-developed and well-nourished.   HENT:   Head: Normocephalic and atraumatic.   Pulmonary/Chest: Effort normal.   Neurological: He is alert and oriented to person, place, and time.   Psychiatric: He has a normal mood and affect. His behavior is normal. Judgment and thought content normal.     General Musculoskeletal Exam   Gait: normal     Right Ankle/Foot Exam     Tests   Heel Walk: able to perform  Tiptoe Walk: able to perform    Left Ankle/Foot Exam     Tests   Heel Walk: able to perform  Tiptoe Walk: able to perform  Back (L-Spine & T-Spine) / Neck (C-Spine) Exam     Back (L-Spine & T-Spine) Range of Motion   Extension: 20 (with back pain)   Flexion: 80 (with right posterior leg pain)   Lateral bend right: 20   Lateral bend left: 20   Rotation right: 40   Rotation left: 40     Spinal Sensation   Right Side Sensation  C-Spine Level: normal   L-Spine Level: normal  S-Spine Level: normal  Left Side Sensation  C-Spine Level: normal  L-Spine Level: normal  S-Spine Level: normal    Back (L-Spine & T-Spine) Tests   Right Side Tests  Straight leg raise:      Sitting SLR: > 70 degrees      Left Side Tests  Straight leg raise:     Sitting SLR: > 70 degrees          Other He has no scoliosis .  Spinal Kyphosis:  Absent    Comments:  Tender over right piriformis     pain with piriformis stretch      Muscle Strength   Right Upper Extremity   Biceps: 5/5/5   Deltoid:  5/5  Triceps:  5/5  Wrist extension: 5/5/5   Finger Flexors:  5/5  Left Upper Extremity  Biceps: 5/5/5   Deltoid:  5/5  Triceps:  5/5  Wrist extension: 5/5/5   Finger Flexors:  5/5  Right Lower Extremity   Hip Flexion: 5/5   Quadriceps:  5/5   Anterior tibial:  5/5/5  EHL:  5/5  Left Lower Extremity   Hip Flexion: 5/5   Quadriceps:  5/5   Anterior tibial:  5/5/5   EHL:  5/5    Reflexes     Left Side  Biceps:  2+  Triceps:  2+  Brachioradialis:  2+  Quadriceps:  2+  Achilles:  2+  Left Gomez's Sign:  Absent  Babinski Sign:  absent    Right Side   Biceps:   2+  Triceps:  2+  Brachioradialis:  2+  Quadriceps:  2+  Achilles:  2+  Right Gomez's Sign:  absent  Babinski Sign:  absent    Vascular Exam     Right Pulses        Carotid:                  2+    Left Pulses        Carotid:                  2+              Assessment:       1. DDD (degenerative disc disease), lumbar    2. Chronic right-sided low back pain with right-sided sciatica    3. Piriformis syndrome of right side           Plan:       Orders Placed This Encounter    Ambulatory referral/consult to [facility]    meloxicam (MOBIC) 15 MG tablet    tiZANidine (ZANAFLEX) 2 MG tablet     1. We discussed back pain and the nature of back pain.  We discussed that it is not one thing that causes the pain but an accumulation of multiple things that we do.    2. We discussed posture sitting and the importance of trying to sit better.  We discussed a curve in the lower back  3. We discussed the benefits of therapy and exercise and continuing to move.  We discussed strengthening and the importance of therapy  4. mobic 15mg po Qday  5. Tizanidine 2mg po Qhs  6. Pt at healthy [facility] pattern 4 lumbar  7. He was shown some hip flexor and piriformis stretches  8. We discussed a pillow behind back with sitting  9. We discussed a tennis ball  10. RTC 4 months    More than 50% of the total time  of 45 minutes was spent face to face in counseling on diagnosis and treatment options. I also counseled patient  on common and most usual side effect of prescribed medications.  I reviewed Primary care , and other specialty's notes to better coordinate patient's care. All questions were answered, and patient voiced understanding.       A consultation note will be sent to Dr. Howard through epic.  Thanks for the consult      Follow-up: No follow-ups on file. If there are any questions prior to this, the patient was instructed to contact the office.

## 2020-02-21 ENCOUNTER — OFFICE VISIT (OUTPATIENT)
Dept: SPINE | Facility: CLINIC | Age: 35
End: 2020-02-21
Attending: PHYSICAL MEDICINE & REHABILITATION
Payer: COMMERCIAL

## 2020-02-21 VITALS
SYSTOLIC BLOOD PRESSURE: 133 MMHG | HEART RATE: 84 BPM | DIASTOLIC BLOOD PRESSURE: 81 MMHG | HEIGHT: 72 IN | BODY MASS INDEX: 36.3 KG/M2 | WEIGHT: 268 LBS

## 2020-02-21 DIAGNOSIS — M54.41 CHRONIC RIGHT-SIDED LOW BACK PAIN WITH RIGHT-SIDED SCIATICA: ICD-10-CM

## 2020-02-21 DIAGNOSIS — G89.29 CHRONIC RIGHT-SIDED LOW BACK PAIN WITH RIGHT-SIDED SCIATICA: ICD-10-CM

## 2020-02-21 DIAGNOSIS — G57.01 PIRIFORMIS SYNDROME OF RIGHT SIDE: ICD-10-CM

## 2020-02-21 DIAGNOSIS — M51.36 DDD (DEGENERATIVE DISC DISEASE), LUMBAR: Primary | ICD-10-CM

## 2020-02-21 PROCEDURE — 99244 PR OFFICE CONSULTATION,LEVEL IV: ICD-10-PCS | Mod: S$GLB,,, | Performed by: PHYSICAL MEDICINE & REHABILITATION

## 2020-02-21 PROCEDURE — 99999 PR PBB SHADOW E&M-EST. PATIENT-LVL IV: CPT | Mod: PBBFAC,,, | Performed by: PHYSICAL MEDICINE & REHABILITATION

## 2020-02-21 PROCEDURE — 99999 PR PBB SHADOW E&M-EST. PATIENT-LVL IV: ICD-10-PCS | Mod: PBBFAC,,, | Performed by: PHYSICAL MEDICINE & REHABILITATION

## 2020-02-21 PROCEDURE — 99244 OFF/OP CNSLTJ NEW/EST MOD 40: CPT | Mod: S$GLB,,, | Performed by: PHYSICAL MEDICINE & REHABILITATION

## 2020-02-21 RX ORDER — MELOXICAM 15 MG/1
15 TABLET ORAL DAILY
Qty: 30 TABLET | Refills: 2 | Status: SHIPPED | OUTPATIENT
Start: 2020-02-21 | End: 2022-11-29 | Stop reason: SDUPTHER

## 2020-02-21 RX ORDER — TIZANIDINE 2 MG/1
2 TABLET ORAL NIGHTLY PRN
Qty: 30 TABLET | Refills: 1 | Status: SHIPPED | OUTPATIENT
Start: 2020-02-21 | End: 2022-11-29

## 2020-02-21 NOTE — LETTER
February 21, 2020      Ramo Howard MD  [address] [facility] [address]           [address]  [address], [address]  [facility] [address]  Phone: [phone]  Fax: [phone]          Patient: Carlos Flores   MR Number: [id number]   YOB: 1985   Date of Visit: 2/21/2020       Dear Dr. Ramo Howard:    Thank you for referring Carlos Flores to me for evaluation. Attached you will find relevant portions of my assessment and plan of care.    If you have questions, please do not hesitate to call me. I look forward to following Carlos Flores along with you.    Sincerely,    Alyssia Hagan MD    Enclosure  CC:  No Recipients    If you would like to receive this communication electronically, please contact [email or web address] or [phone] to request more information on [employer] access.    For providers and/or their staff who would like to refer a patient to [facility], please contact us through our one-stop-shop provider referral line, [facility], at [phone].    If you feel you have received this communication in error or would no longer like to receive these types of communications, please e-mail [email or web address]

## 2020-03-19 ENCOUNTER — TELEPHONE (OUTPATIENT)
Dept: REHABILITATION | Facility: OTHER | Age: 35
End: 2020-03-19

## 2020-05-13 ENCOUNTER — PATIENT MESSAGE (OUTPATIENT)
Dept: SPINE | Facility: CLINIC | Age: 35
End: 2020-05-13

## 2020-05-13 DIAGNOSIS — G89.29 CHRONIC RIGHT-SIDED LOW BACK PAIN WITH RIGHT-SIDED SCIATICA: Primary | ICD-10-CM

## 2020-05-13 DIAGNOSIS — M54.41 CHRONIC RIGHT-SIDED LOW BACK PAIN WITH RIGHT-SIDED SCIATICA: Primary | ICD-10-CM

## 2020-05-13 DIAGNOSIS — M54.16 LUMBAR RADICULOPATHY: ICD-10-CM

## 2020-05-14 ENCOUNTER — PATIENT MESSAGE (OUTPATIENT)
Dept: SPINE | Facility: CLINIC | Age: 35
End: 2020-05-14

## 2020-05-14 NOTE — TELEPHONE ENCOUNTER
MRI lumbar spine ordered and scheduled.    MEKHI Connor, PA-C  Neurosurgery  [facility]  [facility]

## 2020-05-21 ENCOUNTER — HOSPITAL ENCOUNTER (OUTPATIENT)
Dept: RADIOLOGY | Facility: OTHER | Age: 35
Discharge: HOME | End: 2020-05-21
Attending: PHYSICIAN ASSISTANT
Payer: COMMERCIAL

## 2020-05-21 DIAGNOSIS — G89.29 CHRONIC RIGHT-SIDED LOW BACK PAIN WITH RIGHT-SIDED SCIATICA: ICD-10-CM

## 2020-05-21 DIAGNOSIS — M54.16 LUMBAR RADICULOPATHY: ICD-10-CM

## 2020-05-21 DIAGNOSIS — M54.41 CHRONIC RIGHT-SIDED LOW BACK PAIN WITH RIGHT-SIDED SCIATICA: ICD-10-CM

## 2020-05-21 PROCEDURE — 72148 MRI LUMBAR SPINE W/O DYE: CPT | Mod: 26,,, | Performed by: RADIOLOGY

## 2020-05-21 PROCEDURE — 72148 MRI LUMBAR SPINE W/O DYE: CPT | Mod: TC

## 2020-05-21 PROCEDURE — 72148 MRI LUMBAR SPINE WITHOUT CONTRAST: ICD-10-PCS | Mod: 26,,, | Performed by: RADIOLOGY

## 2020-05-25 ENCOUNTER — PATIENT MESSAGE (OUTPATIENT)
Dept: SPINE | Facility: CLINIC | Age: 35
End: 2020-05-25

## 2020-05-25 DIAGNOSIS — M47.26 OSTEOARTHRITIS OF SPINE WITH RADICULOPATHY, LUMBAR REGION: Primary | ICD-10-CM

## 2020-05-25 DIAGNOSIS — M54.16 LUMBAR RADICULOPATHY: ICD-10-CM

## 2020-06-04 ENCOUNTER — TELEPHONE (OUTPATIENT)
Dept: PAIN MEDICINE | Facility: CLINIC | Age: 35
End: 2020-06-04

## 2020-06-04 DIAGNOSIS — Z01.818 PREOP TESTING: Primary | ICD-10-CM

## 2020-06-04 DIAGNOSIS — M54.16 LUMBAR RADICULOPATHY: Primary | ICD-10-CM

## 2020-06-16 ENCOUNTER — HOSPITAL ENCOUNTER (OUTPATIENT)
Dept: PREADMISSION TESTING | Facility: OTHER | Age: 35
Discharge: HOME | End: 2020-06-16
Attending: ANESTHESIOLOGY
Payer: COMMERCIAL

## 2020-06-16 DIAGNOSIS — Z01.818 PREOP TESTING: ICD-10-CM

## 2020-06-16 PROCEDURE — U0003 INFECTIOUS AGENT DETECTION BY NUCLEIC ACID (DNA OR RNA); SEVERE ACUTE RESPIRATORY SYNDROME CORONAVIRUS 2 (SARS-COV-2) (CORONAVIRUS DISEASE [COVID-19]), AMPLIFIED PROBE TECHNIQUE, MAKING USE OF HIGH THROUGHPUT TECHNOLOGIES AS DESCRIBED BY CMS-2020-01-R: HCPCS

## 2020-06-17 LAB — SARS-COV-2 RNA RESP QL NAA+PROBE: NOT DETECTED

## 2020-06-18 ENCOUNTER — HOSPITAL ENCOUNTER (OUTPATIENT)
Facility: OTHER | Age: 35
Discharge: HOME | End: 2020-06-18
Attending: ANESTHESIOLOGY | Admitting: ANESTHESIOLOGY
Payer: COMMERCIAL

## 2020-06-18 VITALS
SYSTOLIC BLOOD PRESSURE: 126 MMHG | BODY MASS INDEX: 35.89 KG/M2 | HEIGHT: 72 IN | HEART RATE: 89 BPM | DIASTOLIC BLOOD PRESSURE: 74 MMHG | RESPIRATION RATE: 18 BRPM | TEMPERATURE: 98 F | OXYGEN SATURATION: 97 % | WEIGHT: 265 LBS

## 2020-06-18 DIAGNOSIS — G89.29 CHRONIC PAIN: ICD-10-CM

## 2020-06-18 DIAGNOSIS — M54.16 LUMBAR RADICULOPATHY: Primary | ICD-10-CM

## 2020-06-18 PROCEDURE — 64483 NJX AA&/STRD TFRM EPI L/S 1: CPT | Mod: RT,,, | Performed by: ANESTHESIOLOGY

## 2020-06-18 PROCEDURE — 25000003 PHARM REV CODE 250: Performed by: ANESTHESIOLOGY

## 2020-06-18 PROCEDURE — 63600175 PHARM REV CODE 636 W HCPCS: Performed by: ANESTHESIOLOGY

## 2020-06-18 PROCEDURE — 25000003 PHARM REV CODE 250: Performed by: STUDENT IN AN ORGANIZED HEALTH CARE EDUCATION/TRAINING PROGRAM

## 2020-06-18 PROCEDURE — 25500020 PHARM REV CODE 255: Performed by: ANESTHESIOLOGY

## 2020-06-18 PROCEDURE — 64483 NJX AA&/STRD TFRM EPI L/S 1: CPT

## 2020-06-18 PROCEDURE — 64483 PR EPIDURAL INJ, ANES/STEROID, TRANSFORAMINAL, LUMB/SACR, SNGL LEVL: ICD-10-PCS | Mod: RT,,, | Performed by: ANESTHESIOLOGY

## 2020-06-18 RX ORDER — SODIUM CHLORIDE 9 MG/ML
500 INJECTION, SOLUTION INTRAVENOUS CONTINUOUS
Status: DISCONTINUED | OUTPATIENT
Start: 2020-06-18 | End: 2020-06-18 | Stop reason: HOSPADM

## 2020-06-18 RX ORDER — MIDAZOLAM HYDROCHLORIDE 1 MG/ML
INJECTION INTRAMUSCULAR; INTRAVENOUS
Status: DISCONTINUED | OUTPATIENT
Start: 2020-06-18 | End: 2020-06-18 | Stop reason: HOSPADM

## 2020-06-18 RX ORDER — DEXAMETHASONE SODIUM PHOSPHATE 100 MG/10ML
INJECTION INTRAMUSCULAR; INTRAVENOUS
Status: DISCONTINUED | OUTPATIENT
Start: 2020-06-18 | End: 2020-06-18 | Stop reason: HOSPADM

## 2020-06-18 RX ORDER — LIDOCAINE HYDROCHLORIDE 10 MG/ML
INJECTION INFILTRATION; PERINEURAL
Status: DISCONTINUED | OUTPATIENT
Start: 2020-06-18 | End: 2020-06-18 | Stop reason: HOSPADM

## 2020-06-18 RX ORDER — LIDOCAINE HYDROCHLORIDE 10 MG/ML
INJECTION, SOLUTION EPIDURAL; INFILTRATION; INTRACAUDAL; PERINEURAL
Status: DISCONTINUED | OUTPATIENT
Start: 2020-06-18 | End: 2020-06-18 | Stop reason: HOSPADM

## 2020-06-18 RX ORDER — FENTANYL CITRATE 50 UG/ML
INJECTION, SOLUTION INTRAMUSCULAR; INTRAVENOUS
Status: DISCONTINUED | OUTPATIENT
Start: 2020-06-18 | End: 2020-06-18 | Stop reason: HOSPADM

## 2020-06-18 RX ADMIN — SODIUM CHLORIDE 500 ML: 0.9 INJECTION, SOLUTION INTRAVENOUS at 10:06

## 2020-06-18 NOTE — DISCHARGE INSTRUCTIONS

## 2020-06-18 NOTE — DISCHARGE SUMMARY
Discharge Note  Short Stay      SUMMARY     Admit Date: 6/18/2020    Attending Physician: Thom Simmons      Discharge Physician: Thom Simmons      Discharge Date: 6/18/2020 11:24 AM    Procedure(s) (LRB):  LUMBAR TRANSFORAMINAL RIGHT S1 DIRECT REFERRAL (Right)    Final Diagnosis: Lumbar radiculopathy [M54.16]    Disposition: Home or self care    Patient Instructions:   Current Discharge Medication List      CONTINUE these medications which have NOT CHANGED    Details   meloxicam (MOBIC) 15 MG tablet Take 1 tablet (15 mg total) by mouth once daily.  Qty: 30 tablet, Refills: 2      tiZANidine (ZANAFLEX) 2 MG tablet Take 1 tablet (2 mg total) by mouth nightly as needed.  Qty: 30 tablet, Refills: 1                 Discharge Diagnosis: Lumbar radiculopathy [M54.16]  Condition on Discharge: Stable with no complications to procedure   Diet on Discharge: Same as before.  Activity: as per instruction sheet.  Discharge to: Home with a responsible adult.  Follow up: 2-4 weeks       Please call my office or pager at [phone] if experienced any weakness or loss of sensation, fever > 101.5, pain uncontrolled with oral medications, persistent nausea/vomiting/or diarrhea, redness or drainage from the incisions, or any other worrisome concerns. If physician on call was not reached or could not communicate with our office for any reason please go to the nearest emergency department

## 2020-06-18 NOTE — H&P
HPI  Patient presenting for Procedure(s) (LRB):  LUMBAR TRANSFORAMINAL RIGHT S1 DIRECT REFERRAL (Right)     Patient on Anti-coagulation No    No health changes since previous encounter    Past Medical History:   Diagnosis Date    Sciatica      Past Surgical History:   Procedure Laterality Date    MOUTH SURGERY       Review of patient's allergies indicates:  No Known Allergies   Current Facility-Administered Medications   Medication    0.9%  NaCl infusion    fentaNYL injection    midazolam (VERSED) 1 mg/mL injection       PMHx, PSHx, Allergies, Medications reviewed in epic    ROS negative except pain complaints in HPI    OBJECTIVE:    BP (!) 143/86 (BP Location: Right arm, Patient Position: Lying)   Pulse 84   Temp 98.2 °F (36.8 °C) (Oral)   Resp 16   Ht 6' (1.829 m)   Wt 120.2 kg (265 lb)   SpO2 98%   BMI 35.94 kg/m²     PHYSICAL EXAMINATION:    GENERAL: Well appearing, in no acute distress, alert and oriented x3.  PSYCH:  Mood and affect appropriate.  SKIN: Skin color, texture, turgor normal, no rashes or lesions which will impact the procedure.  CV: RRR with palpation of the radial artery.  PULM: No evidence of respiratory difficulty, symmetric chest rise. Clear to auscultation.  NEURO: Cranial nerves grossly intact.    Plan:    Proceed with procedure as planned Procedure(s) (LRB):  LUMBAR TRANSFORAMINAL RIGHT S1 DIRECT REFERRAL (Right)    Matt Mcneil  06/18/2020

## 2020-06-18 NOTE — OP NOTE
Date of Service: 06/18/2020    PCP: Ramo Howard MD    Time-out taken to identify patient and procedure side prior to starting the procedure.   I attest that I have reviewed the patient's home medications prior to the procedure and no contraindication have been identified. I  re-evaluated the patient after the patient was positioned for the procedure in the procedure room immediately before the procedural time-out. The vital signs are current and represent the current state of the patient which has not significantly changed since the preprocedure assessment.                                                           PROCEDURE: Right S1 transforaminal epidural steroid injection under fluoroscopy    REASON FOR PROCEDURE: Right Lumbar radiculopathy [M54.16]  1. Lumbar radiculopathy    2. Chronic pain      POSTPROCEDURE DIAGNOSIS:   Lumbar radiculopathy [M54.16]    1. Lumbar radiculopathy    2. Chronic pain           PHYSICIAN: Thom Simmons MD  ASSISTANTS: Matt Mcneil MD; Ben Saavedra MD    MEDICATIONS INJECTED:  Preservative-free dexamethasone 10mg, Xylocaine 1% MPF 3-5ml. 3ml per level. Preservative free, sterile normal saline is used to get larger volume as needed.  LOCAL ANESTHETIC INJECTED:  Xylocaine 1% 9ml with Sodium Bicarbonate 1ml. 3ml per site.    SEDATION MEDICATIONS: local/IV sedation: Versed 2 mg and fentanyl 25 mcg IV.  Conscious sedation ordered by MD.  Patient reevaluated and sedation administered by MD and monitored by RN.  Total sedation time was less than 5 min. (See nurse documentation and case log for sedation time)    ESTIMATED BLOOD LOSS:  None.    COMPLICATIONS:  None.    TECHNIQUE:   Laying in a prone position, the patient was prepped and draped in the usual sterile fashion using ChloraPrep and fenestrated drape.  The area to be injected was determined under fluoroscopic guidance.  Local anesthetic was given by raising a wheel and going down to the hub of a 27-gauge 1.25 inch needle.  The  3.5inch 22-gauge spinal needle was introduced towards the transverse process of all levels as stated above.   The needle was walked medially then hinged into the neural foramen.  Omnipaque was injected to confirm appropriate placement and that there was no vascular runoff.  The medication was then injected after applying negative pressure. The patient tolerated the procedure well.    PAIN BEFORE THE PROCEDURE: 6/10.    PAIN AFTER THE PROCEDURE: 0/10.    The patient was monitored after the procedure.  Patient was given post procedure and discharge instructions to follow at home.  We will see the patient back in two weeks or the patient may call to inform of status. The patient was discharged in a stable condition.

## 2021-01-04 ENCOUNTER — PATIENT MESSAGE (OUTPATIENT)
Dept: ADMINISTRATIVE | Facility: HOSPITAL | Age: 36
End: 2021-01-04

## 2021-04-05 ENCOUNTER — PATIENT MESSAGE (OUTPATIENT)
Dept: ADMINISTRATIVE | Facility: HOSPITAL | Age: 36
End: 2021-04-05

## 2021-04-08 ENCOUNTER — OFFICE VISIT (OUTPATIENT)
Dept: OPTOMETRY | Facility: CLINIC | Age: 36
End: 2021-04-08
Payer: COMMERCIAL

## 2021-04-08 DIAGNOSIS — Z04.9 DISEASE RULED OUT AFTER EXAMINATION: ICD-10-CM

## 2021-04-08 DIAGNOSIS — H52.203 ASTIGMATISM OF BOTH EYES, UNSPECIFIED TYPE: Primary | ICD-10-CM

## 2021-04-08 PROCEDURE — 92015 DETERMINE REFRACTIVE STATE: CPT | Mod: S$GLB,,, | Performed by: OPTOMETRIST

## 2021-04-08 PROCEDURE — 99999 PR PBB SHADOW E&M-EST. PATIENT-LVL II: CPT | Mod: PBBFAC,,, | Performed by: OPTOMETRIST

## 2021-04-08 PROCEDURE — 92004 PR EYE EXAM, NEW PATIENT,COMPREHESV: ICD-10-PCS | Mod: S$GLB,,, | Performed by: OPTOMETRIST

## 2021-04-08 PROCEDURE — 92004 COMPRE OPH EXAM NEW PT 1/>: CPT | Mod: S$GLB,,, | Performed by: OPTOMETRIST

## 2021-04-08 PROCEDURE — 92015 PR REFRACTION: ICD-10-PCS | Mod: S$GLB,,, | Performed by: OPTOMETRIST

## 2021-04-08 PROCEDURE — 99999 PR PBB SHADOW E&M-EST. PATIENT-LVL II: ICD-10-PCS | Mod: PBBFAC,,, | Performed by: OPTOMETRIST

## 2021-07-07 ENCOUNTER — PATIENT MESSAGE (OUTPATIENT)
Dept: ADMINISTRATIVE | Facility: HOSPITAL | Age: 36
End: 2021-07-07

## 2021-08-07 ENCOUNTER — IMMUNIZATION (OUTPATIENT)
Dept: INTERNAL MEDICINE | Facility: CLINIC | Age: 36
End: 2021-08-07
Payer: COMMERCIAL

## 2021-08-07 DIAGNOSIS — Z23 NEED FOR VACCINATION: Primary | ICD-10-CM

## 2021-08-07 PROCEDURE — 91300 COVID-19, MRNA, LNP-S, PF, 30 MCG/0.3 ML DOSE VACCINE: CPT | Mod: ,,, | Performed by: INTERNAL MEDICINE

## 2021-08-07 PROCEDURE — 0001A COVID-19, MRNA, LNP-S, PF, 30 MCG/0.3 ML DOSE VACCINE: CPT | Mod: CV19,,, | Performed by: INTERNAL MEDICINE

## 2021-08-07 PROCEDURE — 0001A COVID-19, MRNA, LNP-S, PF, 30 MCG/0.3 ML DOSE VACCINE: ICD-10-PCS | Mod: CV19,,, | Performed by: INTERNAL MEDICINE

## 2021-08-07 PROCEDURE — 91300 COVID-19, MRNA, LNP-S, PF, 30 MCG/0.3 ML DOSE VACCINE: ICD-10-PCS | Mod: ,,, | Performed by: INTERNAL MEDICINE

## 2021-09-11 ENCOUNTER — IMMUNIZATION (OUTPATIENT)
Dept: INTERNAL MEDICINE | Facility: CLINIC | Age: 36
End: 2021-09-11
Payer: COMMERCIAL

## 2021-09-11 DIAGNOSIS — Z23 NEED FOR VACCINATION: Primary | ICD-10-CM

## 2021-09-11 PROCEDURE — 91300 COVID-19, MRNA, LNP-S, PF, 30 MCG/0.3 ML DOSE VACCINE: CPT | Mod: ,,, | Performed by: INTERNAL MEDICINE

## 2021-09-11 PROCEDURE — 91300 COVID-19, MRNA, LNP-S, PF, 30 MCG/0.3 ML DOSE VACCINE: ICD-10-PCS | Mod: ,,, | Performed by: INTERNAL MEDICINE

## 2021-09-11 PROCEDURE — 0002A COVID-19, MRNA, LNP-S, PF, 30 MCG/0.3 ML DOSE VACCINE: CPT | Mod: CV19,,, | Performed by: INTERNAL MEDICINE

## 2021-09-11 PROCEDURE — 0002A COVID-19, MRNA, LNP-S, PF, 30 MCG/0.3 ML DOSE VACCINE: ICD-10-PCS | Mod: CV19,,, | Performed by: INTERNAL MEDICINE

## 2021-10-04 ENCOUNTER — PATIENT MESSAGE (OUTPATIENT)
Dept: ADMINISTRATIVE | Facility: HOSPITAL | Age: 36
End: 2021-10-04

## 2021-10-09 ENCOUNTER — IMMUNIZATION (OUTPATIENT)
Dept: PRIMARY CARE CLINIC | Facility: CLINIC | Age: 36
End: 2021-10-09
Payer: COMMERCIAL

## 2021-10-09 PROCEDURE — 90686 IIV4 VACC NO PRSV 0.5 ML IM: CPT | Mod: S$GLB,,, | Performed by: INTERNAL MEDICINE

## 2021-10-09 PROCEDURE — 90471 IMMUNIZATION ADMIN: CPT | Mod: S$GLB,,, | Performed by: INTERNAL MEDICINE

## 2021-10-09 PROCEDURE — 90686 FLU VACCINE (QUAD) GREATER THAN OR EQUAL TO 3YO PRESERVATIVE FREE IM: ICD-10-PCS | Mod: S$GLB,,, | Performed by: INTERNAL MEDICINE

## 2021-10-09 PROCEDURE — 90471 FLU VACCINE (QUAD) GREATER THAN OR EQUAL TO 3YO PRESERVATIVE FREE IM: ICD-10-PCS | Mod: S$GLB,,, | Performed by: INTERNAL MEDICINE

## 2022-01-26 ENCOUNTER — PATIENT MESSAGE (OUTPATIENT)
Dept: ADMINISTRATIVE | Facility: HOSPITAL | Age: 37
End: 2022-01-26
Payer: COMMERCIAL

## 2022-03-16 ENCOUNTER — PATIENT MESSAGE (OUTPATIENT)
Dept: ADMINISTRATIVE | Facility: HOSPITAL | Age: 37
End: 2022-03-16
Payer: COMMERCIAL

## 2022-10-25 ENCOUNTER — PATIENT MESSAGE (OUTPATIENT)
Dept: SPINE | Facility: CLINIC | Age: 37
End: 2022-10-25
Payer: COMMERCIAL

## 2022-10-26 NOTE — TELEPHONE ENCOUNTER
We can consider another injection and PT again, but he has not been seen since 2020, so will need a followup with Dr. Hagan

## 2022-11-28 NOTE — PROGRESS NOTES
Subjective:      Patient ID: Carlos Flores is a 37 y.o. male.    Chief Complaint: Low-back Pain and Knee Pain    Mr Flores is a 36 yo male here for follow up of right low back pain.  He has had low back pain since the end of July 2019 and was seen by me on 2/21/2020.  There was no event that started the pain.  He was sent to PT at [facility] but did not make it due to covid concerns.  He previously did PT in 2019 and he had a right S1 TF [place] with pain management 6/2020.  He feels like he was doing well until 3 months ago with complete relief of pain.  Then out of the blue he started having left leg pain.  Previously he was having right leg pain.  He does feel like it has been better the last 2 weeks because he has not been working and feels like that has helped.  The pain goes down the back of the leg to the knee.  The pain comes and goes and mostly with getting up from sitting.  He is ok sitting and walking.  He does have pain standing for too long.  The pain is sharp shooting.  The pain is 1/10 now, worst 3/10 getting up from sitting for past week, before that 9/10 getting up from sitting, best 0/10 lying down walking.  He has not taken anything.  No additional treatment.          MRI lumbar 5/2020  Alignment: Normal.     Vertebrae: Normal marrow signal. No fracture.     Discs: Moderate degenerative disc space narrowing and desiccation at L5-S1 with relative sparing elsewhere.     Cord: Normal.  Conus terminates at approximately the L1-2 level.     Degenerative findings:     T12-L1: No significant abnormality.     L1-L2: No significant abnormality.     L2-L3: No significant abnormality.     L3-L4: No significant abnormality.     L4-L5: No significant abnormality.     L5-S1: Mild diffuse disc protrusion most prominent centrally.  This could potentially affect the descending S1 nerve roots.  Recommend clinical correlation.  Minimal compression of the anterior thecal sac.  Central canal is adequately maintained.   Minimal foraminal narrowing bilaterally.     Paraspinal muscles & soft tissues: Unremarkable.     Impression:     1. Degenerative disc changes at L5-S1 with mild disc protrusion.  2. No acute abnormality.    8/29/2019  Lumbar spine x-ray  There is straightening of the normal lumbar lordosis.  There is levo convexity of the lumbar spine.  There is 1-2 mm anterolisthesis of L4 on L5 with mild disc space narrowing at the L4-5 level.  Mild disc space narrowing is noted at the L4-L5 level.  No abnormal paraspinal masses are identified.  Sacroiliac joints are unremarkable.    Impression      Minimal grade 1 anterolisthesis of L4 on L5, likely degenerative in etiology with mild disc space narrowing.    Levo convexity of the lumbar spine.      Past Medical History:  No date: Sciatica    Past Surgical History:  No date: MOUTH SURGERY    No family history on file.      Social History    Socioeconomic History      Marital status:       Spouse name: Not on file      Number of children: Not on file      Years of education: Not on file      Highest education level: Not on file    Occupational History      Not on file    Social Needs      Financial resource strain: Not on file      Food insecurity:        Worry: Not on file        Inability: Not on file      Transportation needs:        Medical: Not on file        Non-medical: Not on file    Tobacco Use      Smoking status: Never Smoker      Smokeless tobacco: Never Used    Substance and Sexual Activity      Alcohol use: Yes        Comment: occ      Drug use: Never        Comment: denies       Sexual activity: Not on file    Lifestyle      Physical activity:        Days per week: Not on file        Minutes per session: Not on file      Stress: Not on file    Relationships      Social connections:        Talks on phone: Not on file        Gets together: Not on file        Attends Taoist service: Not on file        Active member of club or organization: Not on file         Attends meetings of clubs or organizations: Not on file        Relationship status: Not on file    Other Topics      Concerns:        Not on file    Social History Narrative      Not on file      Current Outpatient Medications:  meloxicam (MOBIC) 7.5 MG tablet, , Disp: , Rfl:   tiZANidine (ZANAFLEX) 2 MG tablet, , Disp: , Rfl:     No current facility-administered medications for this visit.       Review of patient's allergies indicates:  No Known Allergies        Review of Systems   Constitutional: Negative for weight gain and weight loss.   Cardiovascular:  Negative for chest pain.   Respiratory:  Negative for shortness of breath.    Musculoskeletal:  Positive for back pain (left posterior leg). Negative for joint pain and joint swelling.   Gastrointestinal:  Negative for abdominal pain, bowel incontinence, nausea and vomiting.   Genitourinary:  Negative for bladder incontinence.   Neurological:  Negative for numbness and paresthesias.       Objective:        General: Van is well-developed, well-nourished, appears stated age, in no acute distress, alert and oriented to time, place and person.     General    Vitals reviewed.  Constitutional: He is oriented to person, place, and time. He appears well-developed and well-nourished.   HENT:   Head: Normocephalic and atraumatic.   Pulmonary/Chest: Effort normal.   Neurological: He is alert and oriented to person, place, and time.   Psychiatric: He has a normal mood and affect. His behavior is normal. Judgment and thought content normal.     General Musculoskeletal Exam   Gait: normal     Right Ankle/Foot Exam     Tests   Heel Walk: able to perform  Tiptoe Walk: able to perform    Left Ankle/Foot Exam     Tests   Heel Walk: able to perform  Tiptoe Walk: able to perform  Back (L-Spine & T-Spine) / Neck (C-Spine) Exam     Back (L-Spine & T-Spine) Range of Motion   Extension:  30   Flexion:  90   Lateral bend right:  20   Lateral bend left:  20   Rotation right:  40    Rotation left:  40     Spinal Sensation   Right Side Sensation  C-Spine Level: normal   L-Spine Level: normal  S-Spine Level: normal  Left Side Sensation  C-Spine Level: normal  L-Spine Level: normal  S-Spine Level: normal    Back (L-Spine & T-Spine) Tests   Right Side Tests  Straight leg raise:        Sitting SLR: > 70 degrees    Left Side Tests  Straight leg raise:       Sitting SLR: 50 degrees      Other   He has no scoliosis .  Spinal Kyphosis:  Absent      Muscle Strength   Right Upper Extremity   Biceps: 5/5   Deltoid:  5/5  Triceps:  5/5  Wrist extension: 5/5   Finger Flexors:  5/5  Left Upper Extremity  Biceps: 5/5   Deltoid:  5/5  Triceps:  5/5  Wrist extension: 5/5   Finger Flexors:  5/5  Right Lower Extremity   Hip Flexion: 5/5   Quadriceps:  5/5   Anterior tibial:  5/5   EHL:  5/5  Left Lower Extremity   Hip Flexion: 5/5   Quadriceps:  5/5   Anterior tibial:  5/5   EHL:  5/5    Reflexes     Left Side  Biceps:  2+  Triceps:  2+  Brachioradialis:  2+  Achilles:  2+  Left Gomez's Sign:  Absent  Babinski Sign:  absent  Quadriceps:  2+    Right Side   Biceps:  2+  Triceps:  2+  Brachioradialis:  2+  Achilles:  2+  Right Gomez's Sign:  absent  Babinski Sign:  absent  Quadriceps:  2+    Vascular Exam     Right Pulses        Carotid:                  2+    Left Pulses        Carotid:                  2+            Assessment:       1. DDD (degenerative disc disease), lumbar             Plan:       Orders Placed This Encounter    meloxicam (MOBIC) 15 MG tablet     We discussed back pain and the nature of back pain.  We discussed that it is not one thing that causes the pain but an accumulation of multiple things that we do.  We discussed flares of pain.  Same symptoms as 2020 but on left instead of right but currently improved  We discussed posture sitting and the importance of trying to sit better.  We discussed a curve in the lower back when driving.  We discussed a pillow behind back with sitting  We  discussed the benefits of therapy and exercise and continuing to move.  We discussed strengthening and the importance of therapy.  I encourage him to do HEP.  He does not want to go back to PT at this time  MRI lumbar spine reviewed.  We discussed disc at L5-S1 and more central and can effect both sides  mobic 15mg po Qday for a week then as needed  He did great from right S1 TF with 100% relief for years  He is currently better, we discussed if pain flares to send a message and we can order left S1 TF [place]  RTC PRN.  He will send a message and let us know          Follow-up: No follow-ups on file. If there are any questions prior to this, the patient was instructed to contact the office.

## 2022-11-29 ENCOUNTER — OFFICE VISIT (OUTPATIENT)
Dept: SPINE | Facility: CLINIC | Age: 37
End: 2022-11-29
Attending: PHYSICAL MEDICINE & REHABILITATION
Payer: COMMERCIAL

## 2022-11-29 VITALS
TEMPERATURE: 98 F | BODY MASS INDEX: 37.62 KG/M2 | HEART RATE: 91 BPM | RESPIRATION RATE: 16 BRPM | HEIGHT: 72 IN | OXYGEN SATURATION: 100 % | SYSTOLIC BLOOD PRESSURE: 132 MMHG | DIASTOLIC BLOOD PRESSURE: 84 MMHG | WEIGHT: 277.75 LBS

## 2022-11-29 DIAGNOSIS — M51.36 DDD (DEGENERATIVE DISC DISEASE), LUMBAR: Primary | ICD-10-CM

## 2022-11-29 PROCEDURE — 3075F PR MOST RECENT SYSTOLIC BLOOD PRESS GE 130-139MM HG: ICD-10-PCS | Mod: CPTII,S$GLB,, | Performed by: PHYSICAL MEDICINE & REHABILITATION

## 2022-11-29 PROCEDURE — 99999 PR PBB SHADOW E&M-EST. PATIENT-LVL III: ICD-10-PCS | Mod: PBBFAC,,, | Performed by: PHYSICAL MEDICINE & REHABILITATION

## 2022-11-29 PROCEDURE — 99214 OFFICE O/P EST MOD 30 MIN: CPT | Mod: S$GLB,,, | Performed by: PHYSICAL MEDICINE & REHABILITATION

## 2022-11-29 PROCEDURE — 1160F RVW MEDS BY RX/DR IN RCRD: CPT | Mod: CPTII,S$GLB,, | Performed by: PHYSICAL MEDICINE & REHABILITATION

## 2022-11-29 PROCEDURE — 1159F MED LIST DOCD IN RCRD: CPT | Mod: CPTII,S$GLB,, | Performed by: PHYSICAL MEDICINE & REHABILITATION

## 2022-11-29 PROCEDURE — 99999 PR PBB SHADOW E&M-EST. PATIENT-LVL III: CPT | Mod: PBBFAC,,, | Performed by: PHYSICAL MEDICINE & REHABILITATION

## 2022-11-29 PROCEDURE — 3079F PR MOST RECENT DIASTOLIC BLOOD PRESSURE 80-89 MM HG: ICD-10-PCS | Mod: CPTII,S$GLB,, | Performed by: PHYSICAL MEDICINE & REHABILITATION

## 2022-11-29 PROCEDURE — 3079F DIAST BP 80-89 MM HG: CPT | Mod: CPTII,S$GLB,, | Performed by: PHYSICAL MEDICINE & REHABILITATION

## 2022-11-29 PROCEDURE — 3008F BODY MASS INDEX DOCD: CPT | Mod: CPTII,S$GLB,, | Performed by: PHYSICAL MEDICINE & REHABILITATION

## 2022-11-29 PROCEDURE — 1159F PR MEDICATION LIST DOCUMENTED IN MEDICAL RECORD: ICD-10-PCS | Mod: CPTII,S$GLB,, | Performed by: PHYSICAL MEDICINE & REHABILITATION

## 2022-11-29 PROCEDURE — 99214 PR OFFICE/OUTPT VISIT, EST, LEVL IV, 30-39 MIN: ICD-10-PCS | Mod: S$GLB,,, | Performed by: PHYSICAL MEDICINE & REHABILITATION

## 2022-11-29 PROCEDURE — 3008F PR BODY MASS INDEX (BMI) DOCUMENTED: ICD-10-PCS | Mod: CPTII,S$GLB,, | Performed by: PHYSICAL MEDICINE & REHABILITATION

## 2022-11-29 PROCEDURE — 1160F PR REVIEW ALL MEDS BY PRESCRIBER/CLIN PHARMACIST DOCUMENTED: ICD-10-PCS | Mod: CPTII,S$GLB,, | Performed by: PHYSICAL MEDICINE & REHABILITATION

## 2022-11-29 PROCEDURE — 3075F SYST BP GE 130 - 139MM HG: CPT | Mod: CPTII,S$GLB,, | Performed by: PHYSICAL MEDICINE & REHABILITATION

## 2022-11-29 RX ORDER — MELOXICAM 15 MG/1
15 TABLET ORAL DAILY
Qty: 30 TABLET | Refills: 2 | Status: SHIPPED | OUTPATIENT
Start: 2022-11-29 | End: 2023-09-06 | Stop reason: SDUPTHER

## 2023-09-06 ENCOUNTER — OFFICE VISIT (OUTPATIENT)
Dept: PHYSICAL MEDICINE AND REHAB | Facility: CLINIC | Age: 38
End: 2023-09-06
Payer: COMMERCIAL

## 2023-09-06 DIAGNOSIS — M51.36 DDD (DEGENERATIVE DISC DISEASE), LUMBAR: ICD-10-CM

## 2023-09-06 DIAGNOSIS — M54.50 RECURRENT LOW BACK PAIN: Primary | ICD-10-CM

## 2023-09-06 PROCEDURE — 1160F RVW MEDS BY RX/DR IN RCRD: CPT | Mod: CPTII,95,, | Performed by: PHYSICAL MEDICINE & REHABILITATION

## 2023-09-06 PROCEDURE — 1159F MED LIST DOCD IN RCRD: CPT | Mod: CPTII,95,, | Performed by: PHYSICAL MEDICINE & REHABILITATION

## 2023-09-06 PROCEDURE — 99213 OFFICE O/P EST LOW 20 MIN: CPT | Mod: 95,,, | Performed by: PHYSICAL MEDICINE & REHABILITATION

## 2023-09-06 PROCEDURE — 1159F PR MEDICATION LIST DOCUMENTED IN MEDICAL RECORD: ICD-10-PCS | Mod: CPTII,95,, | Performed by: PHYSICAL MEDICINE & REHABILITATION

## 2023-09-06 PROCEDURE — 1160F PR REVIEW ALL MEDS BY PRESCRIBER/CLIN PHARMACIST DOCUMENTED: ICD-10-PCS | Mod: CPTII,95,, | Performed by: PHYSICAL MEDICINE & REHABILITATION

## 2023-09-06 PROCEDURE — 99213 PR OFFICE/OUTPT VISIT, EST, LEVL III, 20-29 MIN: ICD-10-PCS | Mod: 95,,, | Performed by: PHYSICAL MEDICINE & REHABILITATION

## 2023-09-06 RX ORDER — MELOXICAM 15 MG/1
15 TABLET ORAL DAILY
Qty: 30 TABLET | Refills: 2 | Status: SHIPPED | OUTPATIENT
Start: 2023-09-06

## 2023-09-06 NOTE — PROGRESS NOTES
Subjective:      Patient ID: Carlos Flores is a 38 y.o. male.    Chief Complaint: Back Pain    The patient location is: [place]  The chief complaint leading to consultation is: follow up back pain    Visit type: audiovisual    Face to Face time with patient: 10  20 minutes of total time spent on the encounter, which includes face to face time and non-face to face time preparing to see the patient (eg, review of tests), Obtaining and/or reviewing separately obtained history, Documenting clinical information in the electronic or other health record, Independently interpreting results (not separately reported) and communicating results to the patient/family/caregiver, or Care coordination (not separately reported).         Each patient to whom he or she provides medical services by telemedicine is:  (1) informed of the relationship between the physician and patient and the respective role of any other health care provider with respect to management of the patient; and (2) notified that he or she may decline to receive medical services by telemedicine and may withdraw from such care at any time.    Notes:     Mr Flores is a 39 yo male here for follow up of right low back pain.  He has had low back pain since the end of July 2019 and was seen by me on 11/29/2022 and pain had increased over previous couple of months with left greater than right leg pain.  Usually it is right.  There was no event that started the pain.  He previously did PT in 2019 and he had a right S1 TF [place] with pain management 6/2020.  He was having more back pain a week ago.  He was home and lounging around more and felt the pain was increased.  Then once he started moving more it got better.  The pain was down right leg.  He feels like it has been better.  The pain is more with sitting and getting up from sitting.  The pain is sharp shooting.  The pain is 0/10 now, worst 5/10 getting up from sitting, best 0/10.        MRI lumbar  5/2020  Alignment: Normal.     Vertebrae: Normal marrow signal. No fracture.     Discs: Moderate degenerative disc space narrowing and desiccation at L5-S1 with relative sparing elsewhere.     Cord: Normal.  Conus terminates at approximately the L1-2 level.     Degenerative findings:     T12-L1: No significant abnormality.     L1-L2: No significant abnormality.     L2-L3: No significant abnormality.     L3-L4: No significant abnormality.     L4-L5: No significant abnormality.     L5-S1: Mild diffuse disc protrusion most prominent centrally.  This could potentially affect the descending S1 nerve roots.  Recommend clinical correlation.  Minimal compression of the anterior thecal sac.  Central canal is adequately maintained.  Minimal foraminal narrowing bilaterally.     Paraspinal muscles & soft tissues: Unremarkable.     Impression:     1. Degenerative disc changes at L5-S1 with mild disc protrusion.  2. No acute abnormality.    8/29/2019  Lumbar spine x-ray  There is straightening of the normal lumbar lordosis.  There is levo convexity of the lumbar spine.  There is 1-2 mm anterolisthesis of L4 on L5 with mild disc space narrowing at the L4-5 level.  Mild disc space narrowing is noted at the L4-L5 level.  No abnormal paraspinal masses are identified.  Sacroiliac joints are unremarkable.    Impression      Minimal grade 1 anterolisthesis of L4 on L5, likely degenerative in etiology with mild disc space narrowing.    Levo convexity of the lumbar spine.      Past Medical History:  No date: Sciatica    Past Surgical History:  No date: MOUTH SURGERY    No family history on file.      Social History    Socioeconomic History      Marital status:       Spouse name: Not on file      Number of children: Not on file      Years of education: Not on file      Highest education level: Not on file    Occupational History      Not on file    Social Needs      Financial resource strain: Not on file      Food insecurity:         Worry: Not on file        Inability: Not on file      Transportation needs:        Medical: Not on file        Non-medical: Not on file    Tobacco Use      Smoking status: Never Smoker      Smokeless tobacco: Never Used    Substance and Sexual Activity      Alcohol use: Yes        Comment: occ      Drug use: Never        Comment: denies       Sexual activity: Not on file    Lifestyle      Physical activity:        Days per week: Not on file        Minutes per session: Not on file      Stress: Not on file    Relationships      Social connections:        Talks on phone: Not on file        Gets together: Not on file        Attends Oriental orthodox service: Not on file        Active member of club or organization: Not on file        Attends meetings of clubs or organizations: Not on file        Relationship status: Not on file    Other Topics      Concerns:        Not on file    Social History Narrative      Not on file      Current Outpatient Medications:  meloxicam (MOBIC) 7.5 MG tablet, , Disp: , Rfl:   tiZANidine (ZANAFLEX) 2 MG tablet, , Disp: , Rfl:     No current facility-administered medications for this visit.       Review of patient's allergies indicates:  No Known Allergies          Review of Systems   Constitutional: Negative for weight gain and weight loss.   Cardiovascular:  Negative for chest pain.   Respiratory:  Negative for shortness of breath.    Musculoskeletal:  Positive for back pain (right leg). Negative for joint pain and joint swelling.   Gastrointestinal:  Negative for abdominal pain, bowel incontinence, nausea and vomiting.   Genitourinary:  Negative for bladder incontinence.   Neurological:  Negative for numbness and paresthesias.         Objective:        General: Van is well-developed, well-nourished, appears stated age, in no acute distress, alert and oriented to time, place and person.     Ortho/SPM Exam      virtual      Assessment:       1. Recurrent low back pain    2. DDD (degenerative disc  disease), lumbar             Plan:       Orders Placed This Encounter    meloxicam (MOBIC) 15 MG tablet     We discussed back pain and the nature of back pain.  We discussed that it is not one thing that causes the pain but an accumulation of multiple things that we do.  We discussed flares of pain.  Same symptoms as 2020 and improved from last week  We discussed posture sitting and the importance of trying to sit better.  We discussed a curve in the lower back when driving.  We discussed a pillow behind back with sitting  We discussed the benefits of therapy and exercise and continuing to move.  We discussed strengthening and the importance of therapy.  I encourage him to do HEP.  He does not want to go back to PT at this time.  He does not have the time.  We did discuss healthy back when he is ready  MRI lumbar spine reviewed.  We discussed disc at L5-S1 and more central and can effect both sides  mobic 15mg po Qday for a week then as needed  He did great from right S1 TF with 100% relief for years  He is currently better, we discussed if pain flares to send a message and we can order left S1 TF [place]  Treat your own back book byguerrero mckeon  RTC PRN.  He will send a message and let us know          Follow-up: No follow-ups on file. If there are any questions prior to this, the patient was instructed to contact the office.

## 2024-01-09 ENCOUNTER — OFFICE VISIT (OUTPATIENT)
Dept: OPTOMETRY | Facility: CLINIC | Age: 39
End: 2024-01-09
Payer: COMMERCIAL

## 2024-01-09 DIAGNOSIS — H26.9 CORTICAL CATARACT OF BOTH EYES: ICD-10-CM

## 2024-01-09 DIAGNOSIS — H52.203 ASTIGMATISM OF BOTH EYES, UNSPECIFIED TYPE: Primary | ICD-10-CM

## 2024-01-09 DIAGNOSIS — Z04.9 DISEASE RULED OUT AFTER EXAMINATION: ICD-10-CM

## 2024-01-09 PROCEDURE — 92014 COMPRE OPH EXAM EST PT 1/>: CPT | Mod: S$GLB,,, | Performed by: OPTOMETRIST

## 2024-01-09 PROCEDURE — 92015 DETERMINE REFRACTIVE STATE: CPT | Mod: S$GLB,,, | Performed by: OPTOMETRIST

## 2024-01-09 PROCEDURE — 99999 PR PBB SHADOW E&M-EST. PATIENT-LVL II: CPT | Mod: PBBFAC,,, | Performed by: OPTOMETRIST

## 2024-01-09 NOTE — PROGRESS NOTES
JESSICA    MAYRA: 4/8/2021 - Dr. Choi     CC: Pt is here today for a routine eye exam. Pt states that he noticed a   decline of his distance vision since his last exam.    (-)Dryness  (-)Burning  (-)Itchiness  (-)Tearing  (-)Flashes  (-)Floaters   (-)Photophobia  (-)Eye Pain      Diabetic: no    Contact Lens: no    Eye Meds: no  Last edited by Renee Dyer MA on 1/9/2024  3:12 PM.            Assessment /Plan     For exam results, see Encounter Report.    Astigmatism of both eyes, unspecified type    Cortical cataract of both eyes    Disease ruled out after examination              Rx specs     Good overall ocular health, monitor yearly     RTC 1 year

## 2024-01-25 ENCOUNTER — PATIENT MESSAGE (OUTPATIENT)
Dept: INTERNAL MEDICINE | Facility: CLINIC | Age: 39
End: 2024-01-25
Payer: COMMERCIAL

## 2024-01-25 DIAGNOSIS — Z00.00 ANNUAL PHYSICAL EXAM: Primary | ICD-10-CM

## 2024-01-25 DIAGNOSIS — Z11.59 NEED FOR HEPATITIS C SCREENING TEST: ICD-10-CM

## 2024-01-30 ENCOUNTER — LAB VISIT (OUTPATIENT)
Dept: PRIMARY CARE CLINIC | Facility: CLINIC | Age: 39
End: 2024-01-30
Attending: FAMILY MEDICINE
Payer: COMMERCIAL

## 2024-01-30 DIAGNOSIS — Z00.00 ANNUAL PHYSICAL EXAM: ICD-10-CM

## 2024-01-30 DIAGNOSIS — Z11.59 NEED FOR HEPATITIS C SCREENING TEST: ICD-10-CM

## 2024-01-30 LAB
ALBUMIN SERPL BCP-MCNC: 4.2 G/DL (ref 3.5–5.2)
ALP SERPL-CCNC: 42 U/L (ref 55–135)
ALT SERPL W/O P-5'-P-CCNC: 46 U/L (ref 10–44)
ANION GAP SERPL CALC-SCNC: 11 MMOL/L (ref 8–16)
AST SERPL-CCNC: 23 U/L (ref 10–40)
BILIRUB SERPL-MCNC: 1.2 MG/DL (ref 0.1–1)
BUN SERPL-MCNC: 17 MG/DL (ref 6–20)
CALCIUM SERPL-MCNC: 9.5 MG/DL (ref 8.7–10.5)
CHLORIDE SERPL-SCNC: 102 MMOL/L (ref 95–110)
CHOLEST SERPL-MCNC: 230 MG/DL (ref 120–199)
CHOLEST/HDLC SERPL: 5.5 {RATIO} (ref 2–5)
CO2 SERPL-SCNC: 24 MMOL/L (ref 23–29)
CREAT SERPL-MCNC: 1.3 MG/DL (ref 0.5–1.4)
ERYTHROCYTE [DISTWIDTH] IN BLOOD BY AUTOMATED COUNT: 12.6 % (ref 11.5–14.5)
EST. GFR  (NO RACE VARIABLE): >60 ML/MIN/1.73 M^2
ESTIMATED AVG GLUCOSE: 117 MG/DL (ref 68–131)
GLUCOSE SERPL-MCNC: 92 MG/DL (ref 70–110)
HBA1C MFR BLD: 5.7 % (ref 4–5.6)
HCT VFR BLD AUTO: 43.4 % (ref 40–54)
HCV AB SERPL QL IA: NORMAL
HDLC SERPL-MCNC: 42 MG/DL (ref 40–75)
HDLC SERPL: 18.3 % (ref 20–50)
HGB BLD-MCNC: 13.8 G/DL (ref 14–18)
LDLC SERPL CALC-MCNC: 152.8 MG/DL (ref 63–159)
MCH RBC QN AUTO: 27.3 PG (ref 27–31)
MCHC RBC AUTO-ENTMCNC: 31.8 G/DL (ref 32–36)
MCV RBC AUTO: 86 FL (ref 82–98)
NONHDLC SERPL-MCNC: 188 MG/DL
PLATELET # BLD AUTO: 260 K/UL (ref 150–450)
PMV BLD AUTO: 10.9 FL (ref 9.2–12.9)
POTASSIUM SERPL-SCNC: 4.4 MMOL/L (ref 3.5–5.1)
PROT SERPL-MCNC: 7.7 G/DL (ref 6–8.4)
RBC # BLD AUTO: 5.05 M/UL (ref 4.6–6.2)
SODIUM SERPL-SCNC: 137 MMOL/L (ref 136–145)
TRIGL SERPL-MCNC: 176 MG/DL (ref 30–150)
TSH SERPL DL<=0.005 MIU/L-ACNC: 2.67 UIU/ML (ref 0.4–4)
WBC # BLD AUTO: 5.28 K/UL (ref 3.9–12.7)

## 2024-01-30 PROCEDURE — 85027 COMPLETE CBC AUTOMATED: CPT | Performed by: FAMILY MEDICINE

## 2024-01-30 PROCEDURE — 80061 LIPID PANEL: CPT | Performed by: FAMILY MEDICINE

## 2024-01-30 PROCEDURE — 80053 COMPREHEN METABOLIC PANEL: CPT | Performed by: FAMILY MEDICINE

## 2024-01-30 PROCEDURE — 84443 ASSAY THYROID STIM HORMONE: CPT | Performed by: FAMILY MEDICINE

## 2024-01-30 PROCEDURE — 36415 COLL VENOUS BLD VENIPUNCTURE: CPT | Performed by: FAMILY MEDICINE

## 2024-01-30 PROCEDURE — 86803 HEPATITIS C AB TEST: CPT | Performed by: FAMILY MEDICINE

## 2024-01-30 PROCEDURE — 83036 HEMOGLOBIN GLYCOSYLATED A1C: CPT | Performed by: FAMILY MEDICINE

## 2024-02-04 ENCOUNTER — PATIENT MESSAGE (OUTPATIENT)
Dept: INTERNAL MEDICINE | Facility: CLINIC | Age: 39
End: 2024-02-04
Payer: COMMERCIAL

## 2024-02-20 ENCOUNTER — OFFICE VISIT (OUTPATIENT)
Dept: INTERNAL MEDICINE | Facility: CLINIC | Age: 39
End: 2024-02-20
Payer: COMMERCIAL

## 2024-02-20 ENCOUNTER — PATIENT MESSAGE (OUTPATIENT)
Dept: INTERNAL MEDICINE | Facility: CLINIC | Age: 39
End: 2024-02-20

## 2024-02-20 VITALS
HEIGHT: 72 IN | WEIGHT: 280.88 LBS | SYSTOLIC BLOOD PRESSURE: 130 MMHG | OXYGEN SATURATION: 95 % | DIASTOLIC BLOOD PRESSURE: 84 MMHG | HEART RATE: 98 BPM | BODY MASS INDEX: 38.04 KG/M2

## 2024-02-20 DIAGNOSIS — R74.01 ELEVATED ALT MEASUREMENT: ICD-10-CM

## 2024-02-20 DIAGNOSIS — Z00.00 ANNUAL PHYSICAL EXAM: Primary | ICD-10-CM

## 2024-02-20 DIAGNOSIS — E78.2 MIXED HYPERLIPIDEMIA: ICD-10-CM

## 2024-02-20 DIAGNOSIS — Z83.3 FAMILY HISTORY OF DIABETES MELLITUS: ICD-10-CM

## 2024-02-20 PROBLEM — R03.0 ELEVATED BLOOD PRESSURE READING WITHOUT DIAGNOSIS OF HYPERTENSION: Status: RESOLVED | Noted: 2019-11-07 | Resolved: 2024-02-20

## 2024-02-20 PROCEDURE — 99999 PR PBB SHADOW E&M-EST. PATIENT-LVL III: CPT | Mod: PBBFAC,,, | Performed by: FAMILY MEDICINE

## 2024-02-20 PROCEDURE — 3044F HG A1C LEVEL LT 7.0%: CPT | Mod: CPTII,S$GLB,, | Performed by: FAMILY MEDICINE

## 2024-02-20 PROCEDURE — 3075F SYST BP GE 130 - 139MM HG: CPT | Mod: CPTII,S$GLB,, | Performed by: FAMILY MEDICINE

## 2024-02-20 PROCEDURE — 3079F DIAST BP 80-89 MM HG: CPT | Mod: CPTII,S$GLB,, | Performed by: FAMILY MEDICINE

## 2024-02-20 PROCEDURE — 1159F MED LIST DOCD IN RCRD: CPT | Mod: CPTII,S$GLB,, | Performed by: FAMILY MEDICINE

## 2024-02-20 PROCEDURE — 99385 PREV VISIT NEW AGE 18-39: CPT | Mod: S$GLB,,, | Performed by: FAMILY MEDICINE

## 2024-02-20 PROCEDURE — 3008F BODY MASS INDEX DOCD: CPT | Mod: CPTII,S$GLB,, | Performed by: FAMILY MEDICINE

## 2024-02-20 NOTE — PROGRESS NOTES
Subjective:       Patient ID: Carlos Flores is a 39 y.o. male.    Chief Complaint: Annual Exam    HPI    Carlos Flores is a 39 y.o. male for checkup.     Labs prior, review.    #Cards: HLD    #Ortho: DDD lumbar  - est w/ Dr. Hagan, lv 9/2023  - mri 5/2020    #BMI 38    Review of Systems   Constitutional:  Negative for chills, fatigue and fever.   HENT:  Negative for congestion.    Respiratory:  Negative for cough and shortness of breath.    Cardiovascular:  Negative for chest pain and palpitations.   Gastrointestinal:  Negative for abdominal pain, constipation, diarrhea, nausea and vomiting.   Genitourinary:  Negative for dysuria and hematuria.   Musculoskeletal:  Negative for back pain.   Skin:  Negative for rash.   Neurological:  Negative for weakness, numbness and headaches.         Past Medical History:   Diagnosis Date    Elevated blood pressure reading without diagnosis of hypertension 11/07/2019    Sciatica         Prior to Admission medications    Medication Sig Start Date End Date Taking? Authorizing Provider   meloxicam (MOBIC) 15 MG tablet Take 1 tablet (15 mg total) by mouth once daily. 9/6/23   Alyssia Hagan MD        Past medical history, surgical history, and family medical history reviewed and updated as appropriate.    Medications and allergies reviewed.     Objective:          Vitals:    02/20/24 1331   BP: 130/84   BP Location: Left arm   Patient Position: Sitting   BP Method: Large (Manual)   Pulse: 98   SpO2: 95%   Weight: 127.4 kg (280 lb 13.9 oz)   Height: 6' (1.829 m)     Body mass index is 38.09 kg/m².  Physical Exam  Vitals and nursing note reviewed.   Constitutional:       General: He is not in acute distress.     Appearance: Normal appearance. He is well-developed.   Eyes:      Extraocular Movements: Extraocular movements intact.   Cardiovascular:      Rate and Rhythm: Normal rate and regular rhythm.      Pulses: Normal pulses.      Heart sounds: Normal heart sounds. No  murmur heard.  Pulmonary:      Effort: Pulmonary effort is normal. No respiratory distress.      Breath sounds: Normal breath sounds. No wheezing.   Neurological:      Mental Status: He is alert and oriented to person, place, and time.         Lab Results   Component Value Date    WBC 5.28 01/30/2024    HGB 13.8 (L) 01/30/2024    HCT 43.4 01/30/2024     01/30/2024    CHOL 230 (H) 01/30/2024    TRIG 176 (H) 01/30/2024    HDL 42 01/30/2024    ALT 46 (H) 01/30/2024    AST 23 01/30/2024     01/30/2024    K 4.4 01/30/2024     01/30/2024    CREATININE 1.3 01/30/2024    BUN 17 01/30/2024    CO2 24 01/30/2024    TSH 2.672 01/30/2024    HGBA1C 5.7 (H) 01/30/2024       Assessment:       1. Annual physical exam    2. Mixed hyperlipidemia    3. Elevated ALT measurement    4. Family history of diabetes mellitus          Plan:   1. Annual physical exam    2. Mixed hyperlipidemia    3. Elevated ALT measurement    4. Family history of diabetes mellitus      Healthy lifestyle encouraged today, f/u 1yr  Health maintenance reviewed with patient.     No follow-ups on file.    Ramo Howard MD  Family Medicine / Primary Care  [facility]  2/20/2024

## 2024-03-11 ENCOUNTER — PATIENT MESSAGE (OUTPATIENT)
Dept: INTERNAL MEDICINE | Facility: CLINIC | Age: 39
End: 2024-03-11
Payer: COMMERCIAL

## 2024-03-12 ENCOUNTER — TELEPHONE (OUTPATIENT)
Dept: INTERNAL MEDICINE | Facility: CLINIC | Age: 39
End: 2024-03-12
Payer: COMMERCIAL

## 2024-03-13 ENCOUNTER — PATIENT MESSAGE (OUTPATIENT)
Dept: INTERNAL MEDICINE | Facility: CLINIC | Age: 39
End: 2024-03-13
Payer: COMMERCIAL

## 2024-03-18 ENCOUNTER — PATIENT MESSAGE (OUTPATIENT)
Dept: INTERNAL MEDICINE | Facility: CLINIC | Age: 39
End: 2024-03-18
Payer: COMMERCIAL

## 2024-04-19 ENCOUNTER — PATIENT MESSAGE (OUTPATIENT)
Dept: INTERNAL MEDICINE | Facility: CLINIC | Age: 39
End: 2024-04-19
Payer: COMMERCIAL

## 2024-04-30 ENCOUNTER — OFFICE VISIT (OUTPATIENT)
Dept: INTERNAL MEDICINE | Facility: CLINIC | Age: 39
End: 2024-04-30
Payer: COMMERCIAL

## 2024-04-30 DIAGNOSIS — M54.9 DORSALGIA, UNSPECIFIED: Primary | ICD-10-CM

## 2024-04-30 DIAGNOSIS — G89.29 CHRONIC RADICULAR LOW BACK PAIN: ICD-10-CM

## 2024-04-30 DIAGNOSIS — M54.16 CHRONIC RADICULAR LOW BACK PAIN: ICD-10-CM

## 2024-04-30 DIAGNOSIS — M25.572 LEFT ANKLE PAIN, UNSPECIFIED CHRONICITY: ICD-10-CM

## 2024-04-30 PROCEDURE — 1160F RVW MEDS BY RX/DR IN RCRD: CPT | Mod: CPTII,95,, | Performed by: FAMILY MEDICINE

## 2024-04-30 PROCEDURE — 3044F HG A1C LEVEL LT 7.0%: CPT | Mod: CPTII,95,, | Performed by: FAMILY MEDICINE

## 2024-04-30 PROCEDURE — 99214 OFFICE O/P EST MOD 30 MIN: CPT | Mod: 95,,, | Performed by: FAMILY MEDICINE

## 2024-04-30 PROCEDURE — 1159F MED LIST DOCD IN RCRD: CPT | Mod: CPTII,95,, | Performed by: FAMILY MEDICINE

## 2024-04-30 NOTE — PROGRESS NOTES
Subjective:       Patient ID: Carlos Flores is a 39 y.o. male.    Chief Complaint: No chief complaint on file.    Back Pain  This is a recurrent problem. The current episode started more than 1 year ago. The problem occurs 2 to 4 times per day. The problem has been gradually improving since onset. The pain is present in the sacro-iliac. The quality of the pain is described as burning. The pain does not radiate. The pain is at a severity of 8/10. The pain is moderate. The symptoms are aggravated by standing. Stiffness is present All day. Pertinent negatives include no abdominal pain, bladder incontinence, bowel incontinence, chest pain, dysuria, fever, headaches, leg pain, numbness, paresis, paresthesias, pelvic pain, perianal numbness, tingling, weakness or weight loss. Risk factors include lack of exercise and obesity. The treatment provided significant relief.     The patient location is: [place]  The chief complaint leading to consultation is: pain    Visit type: audiovisual    Face to Face time with patient: 10 min   30 minutes of total time spent on the encounter, which includes face to face time and non-face to face time preparing to see the patient (eg, review of tests), Obtaining and/or reviewing separately obtained history, Documenting clinical information in the electronic or other health record, Independently interpreting results (not separately reported) and communicating results to the patient/family/caregiver, or Care coordination (not separately reported).         Each patient to whom he or she provides medical services by telemedicine is:  (1) informed of the relationship between the physician and patient and the respective role of any other health care provider with respect to management of the patient; and (2) notified that he or she may decline to receive medical services by telemedicine and may withdraw from such care at any time.    Notes:     Carlos Flores is a 39 y.o. male for  virtual.    Left ankle pain   Twisted laterally ~1month ago, persistent pain, rosanne when getting in and out of truck and mvmt. Tries to elevate, wrap.     Recurrent low back pain  Request for reimaging and relief with jody in past thru pain mgmt     #Cards: HLD     #Ortho: DDD lumbar  - est w/ Dr. Hagan, lv 9/2023  - mri 5/2020     #BMI 38    Review of Systems   Constitutional:  Negative for fever and weight loss.   Cardiovascular:  Negative for chest pain.   Gastrointestinal:  Negative for abdominal pain and bowel incontinence.   Genitourinary:  Negative for bladder incontinence, dysuria, hematuria and pelvic pain.   Musculoskeletal:  Positive for back pain.   Neurological:  Negative for tingling, weakness, numbness, headaches and paresthesias.         Past Medical History:   Diagnosis Date    Elevated blood pressure reading without diagnosis of hypertension 11/07/2019    Sciatica         Prior to Admission medications    Medication Sig Start Date End Date Taking? Authorizing Provider   meloxicam (MOBIC) 15 MG tablet Take 1 tablet (15 mg total) by mouth once daily. 9/6/23   Alyssia Hagan MD        Past medical history, surgical history, and family medical history reviewed and updated as appropriate.    Medications and allergies reviewed.     Objective:          There were no vitals filed for this visit.  There is no height or weight on file to calculate BMI.  Physical Exam  Constitutional:       General: He is not in acute distress.     Appearance: Normal appearance.   Eyes:      Extraocular Movements: Extraocular movements intact.   Pulmonary:      Effort: Pulmonary effort is normal. No respiratory distress.   Neurological:      Mental Status: He is alert and oriented to person, place, and time.   Psychiatric:         Mood and Affect: Mood normal.         Behavior: Behavior normal.         Lab Results   Component Value Date    WBC 5.28 01/30/2024    HGB 13.8 (L) 01/30/2024    HCT 43.4 01/30/2024      01/30/2024    CHOL 230 (H) 01/30/2024    TRIG 176 (H) 01/30/2024    HDL 42 01/30/2024    ALT 46 (H) 01/30/2024    AST 23 01/30/2024     01/30/2024    K 4.4 01/30/2024     01/30/2024    CREATININE 1.3 01/30/2024    BUN 17 01/30/2024    CO2 24 01/30/2024    TSH 2.672 01/30/2024    HGBA1C 5.7 (H) 01/30/2024       Assessment:       1. Dorsalgia, unspecified    2. Chronic radicular low back pain    3. Left ankle pain, unspecified chronicity          Plan:   1. Dorsalgia, unspecified  -     MRI Lumbar Spine Without Contrast; Future; Expected date: 04/30/2024    2. Chronic radicular low back pain  -     MRI Lumbar Spine Without Contrast; Future; Expected date: 04/30/2024  -     Ambulatory referral/consult to Pain Clinic; Future; Expected date: 05/07/2024    3. Left ankle pain, unspecified chronicity  -     X-Ray Ankle Complete Left; Future; Expected date: 04/30/2024      Rec xray, icing, compression, tylenol prn with ankle. F/u for persistent pain    Will go thru similar workup as 2020 with regard to back per pt request.    Exam limited given virtual nature of visit.     Follow up should symptoms persist or worsen. If symptoms become severe, please report immediately to urgent care or emergency room for further evaluation. Patient voiced understanding.      No follow-ups on file.    Ramo Howard MD  Family Medicine / Primary Care  [facility]  4/30/2024    Answers submitted by the patient for this visit:  Back Pain Questionnaire (Submitted on 4/30/2024)  Chief Complaint: Back pain  genital pain: No

## 2024-05-03 ENCOUNTER — PATIENT MESSAGE (OUTPATIENT)
Dept: PHYSICAL MEDICINE AND REHAB | Facility: CLINIC | Age: 39
End: 2024-05-03
Payer: COMMERCIAL

## 2024-05-03 ENCOUNTER — HOSPITAL ENCOUNTER (OUTPATIENT)
Dept: RADIOLOGY | Facility: OTHER | Age: 39
Discharge: HOME | End: 2024-05-03
Attending: FAMILY MEDICINE
Payer: COMMERCIAL

## 2024-05-03 DIAGNOSIS — G89.29 CHRONIC RADICULAR LOW BACK PAIN: ICD-10-CM

## 2024-05-03 DIAGNOSIS — M25.572 LEFT ANKLE PAIN, UNSPECIFIED CHRONICITY: ICD-10-CM

## 2024-05-03 DIAGNOSIS — M54.9 DORSALGIA, UNSPECIFIED: ICD-10-CM

## 2024-05-03 DIAGNOSIS — M54.16 CHRONIC RADICULAR LOW BACK PAIN: ICD-10-CM

## 2024-05-03 PROCEDURE — 72148 MRI LUMBAR SPINE W/O DYE: CPT | Mod: TC

## 2024-05-03 PROCEDURE — 73610 X-RAY EXAM OF ANKLE: CPT | Mod: 26,LT,, | Performed by: RADIOLOGY

## 2024-05-03 PROCEDURE — 72148 MRI LUMBAR SPINE W/O DYE: CPT | Mod: 26,,, | Performed by: RADIOLOGY

## 2024-05-03 PROCEDURE — 73610 X-RAY EXAM OF ANKLE: CPT | Mod: TC,FY,LT

## 2024-05-15 PROBLEM — M47.816 DJD (DEGENERATIVE JOINT DISEASE), LUMBAR: Status: ACTIVE | Noted: 2024-05-15

## 2024-07-26 ENCOUNTER — ON-DEMAND VIRTUAL (OUTPATIENT)
Dept: URGENT CARE | Facility: CLINIC | Age: 39
End: 2024-07-26

## 2024-07-26 DIAGNOSIS — H92.09 OTALGIA, UNSPECIFIED LATERALITY: Primary | ICD-10-CM

## 2024-07-26 NOTE — PROGRESS NOTES
Subjective:      Patient ID: Carlos Flores is a 39 y.o. male.    Vitals:  vitals were not taken for this visit.     Chief Complaint: No chief complaint on file.      Visit Type: TELE AUDIOVISUAL    Present with the patient at the time of consultation: TELEMED PRESENT WITH PATIENT: None    Past Medical History:   Diagnosis Date    Elevated blood pressure reading without diagnosis of hypertension 11/07/2019    Sciatica      Past Surgical History:   Procedure Laterality Date    MOUTH SURGERY      TRANSFORAMINAL EPIDURAL INJECTION OF STEROID Right 6/18/2020    Procedure: LUMBAR TRANSFORAMINAL RIGHT S1 DIRECT REFERRAL;  Surgeon: Thom Simmons MD;  Location: [facility], [place], operated by [facility];  Service: Pain Management;  Laterality: Right;  NEEDS CONSENT     Review of patient's allergies indicates:  No Known Allergies  Current Outpatient Medications on File Prior to Visit   Medication Sig Dispense Refill    meloxicam (MOBIC) 15 MG tablet Take 1 tablet (15 mg total) by mouth once daily. 30 tablet 2     No current facility-administered medications on file prior to visit.     Family History   Problem Relation Name Age of Onset    Diabetes Father      Cancer Maternal Grandmother      Colon cancer Paternal Grandfather             Ohs Peq Odvv Intake    7/26/2024  4:54 AM CDT - Filed by Patient   What is your current physical address in the event of a medical emergency? [address], [place] [address]   Are you able to take your vital signs? No   Please attach any relevant images or files          38 yo male presents for ear popping on Wednesday and then muffled hearing since then and pain from Thursday. (2 days)  Patient has taken tylenol for symptoms and advil.  Denies fever or drainage from the ear.  Patient is located at home for his visit.      ROS     Objective:   The physical exam was conducted virtually.  Physical Exam    Assessment:     1. Otalgia, unspecified laterality        Plan:       Otalgia, unspecified  laterality      Please use over the counter decongestants such as sudafed for your discomfort.  Continue to alternate tylenol with advil for pain.

## 2024-07-26 NOTE — PATIENT INSTRUCTIONS
Please use over the counter decongestants such as sudafed for your discomfort.  Continue to alternate tylenol with advil for pain.

## 2025-06-02 ENCOUNTER — OFFICE VISIT (OUTPATIENT)
Dept: PAIN MEDICINE | Facility: CLINIC | Age: 40
End: 2025-06-02
Attending: ANESTHESIOLOGY
Payer: COMMERCIAL

## 2025-06-02 VITALS
WEIGHT: 282.63 LBS | BODY MASS INDEX: 38.28 KG/M2 | HEIGHT: 72 IN | HEART RATE: 104 BPM | OXYGEN SATURATION: 97 % | TEMPERATURE: 99 F | DIASTOLIC BLOOD PRESSURE: 80 MMHG | SYSTOLIC BLOOD PRESSURE: 119 MMHG | RESPIRATION RATE: 19 BRPM

## 2025-06-02 DIAGNOSIS — G89.29 CHRONIC RIGHT-SIDED LOW BACK PAIN WITH RIGHT-SIDED SCIATICA: ICD-10-CM

## 2025-06-02 DIAGNOSIS — G89.29 CHRONIC RIGHT-SIDED LOW BACK PAIN WITH RIGHT-SIDED SCIATICA: Primary | ICD-10-CM

## 2025-06-02 DIAGNOSIS — M54.41 CHRONIC RIGHT-SIDED LOW BACK PAIN WITH RIGHT-SIDED SCIATICA: ICD-10-CM

## 2025-06-02 DIAGNOSIS — M54.41 CHRONIC RIGHT-SIDED LOW BACK PAIN WITH RIGHT-SIDED SCIATICA: Primary | ICD-10-CM

## 2025-06-02 DIAGNOSIS — M47.26 OSTEOARTHRITIS OF SPINE WITH RADICULOPATHY, LUMBAR REGION: Primary | ICD-10-CM

## 2025-06-02 PROCEDURE — 99204 OFFICE O/P NEW MOD 45 MIN: CPT | Mod: S$GLB,,, | Performed by: ANESTHESIOLOGY

## 2025-06-02 PROCEDURE — 1160F RVW MEDS BY RX/DR IN RCRD: CPT | Mod: CPTII,S$GLB,, | Performed by: ANESTHESIOLOGY

## 2025-06-02 PROCEDURE — 3079F DIAST BP 80-89 MM HG: CPT | Mod: CPTII,S$GLB,, | Performed by: ANESTHESIOLOGY

## 2025-06-02 PROCEDURE — 3074F SYST BP LT 130 MM HG: CPT | Mod: CPTII,S$GLB,, | Performed by: ANESTHESIOLOGY

## 2025-06-02 PROCEDURE — 99999 PR PBB SHADOW E&M-EST. PATIENT-LVL III: CPT | Mod: PBBFAC,,, | Performed by: ANESTHESIOLOGY

## 2025-06-02 PROCEDURE — 1159F MED LIST DOCD IN RCRD: CPT | Mod: CPTII,S$GLB,, | Performed by: ANESTHESIOLOGY

## 2025-06-02 PROCEDURE — 3008F BODY MASS INDEX DOCD: CPT | Mod: CPTII,S$GLB,, | Performed by: ANESTHESIOLOGY

## 2025-06-16 ENCOUNTER — HOSPITAL ENCOUNTER (OUTPATIENT)
Facility: OTHER | Age: 40
Discharge: HOME | End: 2025-06-16
Attending: ANESTHESIOLOGY | Admitting: ANESTHESIOLOGY
Payer: COMMERCIAL

## 2025-06-16 VITALS
TEMPERATURE: 98 F | DIASTOLIC BLOOD PRESSURE: 62 MMHG | HEART RATE: 90 BPM | BODY MASS INDEX: 37.93 KG/M2 | RESPIRATION RATE: 18 BRPM | OXYGEN SATURATION: 95 % | WEIGHT: 280 LBS | HEIGHT: 72 IN | SYSTOLIC BLOOD PRESSURE: 118 MMHG

## 2025-06-16 DIAGNOSIS — M54.16 LUMBAR RADICULOPATHY: Primary | ICD-10-CM

## 2025-06-16 DIAGNOSIS — G89.29 CHRONIC PAIN: ICD-10-CM

## 2025-06-16 PROCEDURE — 63600175 PHARM REV CODE 636 W HCPCS: Performed by: ANESTHESIOLOGY

## 2025-06-16 PROCEDURE — 64483 NJX AA&/STRD TFRM EPI L/S 1: CPT | Mod: RT,,, | Performed by: ANESTHESIOLOGY

## 2025-06-16 PROCEDURE — 64483 NJX AA&/STRD TFRM EPI L/S 1: CPT | Mod: RT | Performed by: ANESTHESIOLOGY

## 2025-06-16 PROCEDURE — 25500020 PHARM REV CODE 255: Performed by: ANESTHESIOLOGY

## 2025-06-16 RX ORDER — DEXAMETHASONE SODIUM PHOSPHATE 10 MG/ML
INJECTION, SOLUTION INTRA-ARTICULAR; INTRALESIONAL; INTRAMUSCULAR; INTRAVENOUS; SOFT TISSUE
Status: DISCONTINUED | OUTPATIENT
Start: 2025-06-16 | End: 2025-06-16 | Stop reason: HOSPADM

## 2025-06-16 RX ORDER — LIDOCAINE HYDROCHLORIDE 10 MG/ML
INJECTION, SOLUTION EPIDURAL; INFILTRATION; INTRACAUDAL; PERINEURAL
Status: DISCONTINUED | OUTPATIENT
Start: 2025-06-16 | End: 2025-06-16 | Stop reason: HOSPADM

## 2025-06-16 RX ORDER — SODIUM CHLORIDE 9 MG/ML
INJECTION, SOLUTION INTRAVENOUS CONTINUOUS
Status: DISCONTINUED | OUTPATIENT
Start: 2025-06-16 | End: 2025-06-16 | Stop reason: HOSPADM

## 2025-06-16 RX ORDER — LIDOCAINE HYDROCHLORIDE 20 MG/ML
INJECTION, SOLUTION INFILTRATION; PERINEURAL
Status: DISCONTINUED | OUTPATIENT
Start: 2025-06-16 | End: 2025-06-16 | Stop reason: HOSPADM

## 2025-06-16 RX ORDER — FENTANYL CITRATE 50 UG/ML
INJECTION, SOLUTION INTRAMUSCULAR; INTRAVENOUS
Status: DISCONTINUED | OUTPATIENT
Start: 2025-06-16 | End: 2025-06-16 | Stop reason: HOSPADM

## 2025-06-16 RX ORDER — MIDAZOLAM HYDROCHLORIDE 1 MG/ML
INJECTION INTRAMUSCULAR; INTRAVENOUS
Status: DISCONTINUED | OUTPATIENT
Start: 2025-06-16 | End: 2025-06-16 | Stop reason: HOSPADM

## 2025-06-16 NOTE — H&P
HPI  Patient presenting for Procedure(s) (LRB):  LUMBAR TRANSFORAMINAL RIGHT S1 (Right)     Patient on Anti-coagulation No    No health changes since previous encounter    Past Medical History:   Diagnosis Date    Elevated blood pressure reading without diagnosis of hypertension 11/07/2019    Sciatica      Past Surgical History:   Procedure Laterality Date    MOUTH SURGERY      TRANSFORAMINAL EPIDURAL INJECTION OF STEROID Right 6/18/2020    Procedure: LUMBAR TRANSFORAMINAL RIGHT S1 DIRECT REFERRAL;  Surgeon: Thom Simmons MD;  Location: [facility];  Service: Pain Management;  Laterality: Right;  NEEDS CONSENT     Review of patient's allergies indicates:  No Known Allergies   Current Facility-Administered Medications   Medication    0.9% NaCl infusion       PMHx, PSHx, Allergies, Medications reviewed in epic    ROS negative except pain complaints in HPI    OBJECTIVE:    BP (!) 162/80 (BP Location: Right arm, Patient Position: Lying)   Pulse 89   Temp 98.2 °F (36.8 °C) (Oral)   Resp 15   Ht 6' (1.829 m)   Wt 127 kg (280 lb)   SpO2 95%   BMI 37.97 kg/m²     PHYSICAL EXAMINATION:    GENERAL: Well appearing, in no acute distress, alert and oriented x3.  PSYCH:  Mood and affect appropriate.  SKIN: Skin color, texture, turgor normal, no rashes or lesions which will impact the procedure.  CV: RRR with palpation of the radial artery.  PULM: No evidence of respiratory difficulty, symmetric chest rise. Clear to auscultation.  NEURO: Cranial nerves grossly intact.    Plan:    Proceed with procedure as planned Procedure(s) (LRB):  LUMBAR TRANSFORAMINAL RIGHT S1 (Right)    Alonso Bright  06/16/2025

## 2025-06-16 NOTE — OP NOTE
Lumbar Transforaminal Epidural Steroid Injection under Fluoroscopic Guidance    The procedure, risks, benefits, and options were discussed with the patient. There are no contraindications to the procedure. The patent expressed understanding and agreed to the procedure. Informed written consent was obtained prior to the start of the procedure and can be found in the patient's chart.    PATIENT NAME: Carlos Flores   MRN: [id number]     DATE OF PROCEDURE: 06/16/2025    PROCEDURE:  Right  S1 Lumbar Transforaminal Epidural Steroid Injection under Fluoroscopic Guidance    PRE-OP DIAGNOSIS: Chronic right-sided low back pain with right-sided sciatica [M54.41, G89.29] Lumbar radiculopathy [M54.16]    POST-OP DIAGNOSIS: Same    PHYSICIAN: Thom Simmons MD    ASSISTANTS: Alonso Bright MD  [facility]      MEDICATIONS INJECTED: Preservative-free Decadron 10mg with 5cc of Lidocaine 1% MPF     LOCAL ANESTHETIC INJECTED: Xylocaine 2%     SEDATION: Versed 2mg and Fentanyl 25mcg                                                                                                                                                                                     Conscious sedation ordered by M.D. Patient re-evaluation prior to administration of conscious sedation. No changes noted in patient's status from initial evaluation. The patient's vital signs were monitored by RN and patient remained hemodynamically stable throughout the procedure.    Event Time In   Sedation Start 1102   Sedation End 1106       ESTIMATED BLOOD LOSS: None    COMPLICATIONS: None    TECHNIQUE: Time-out was performed to identify the patient and procedure to be performed. With the patient laying in a prone position, the surgical area was prepped and draped in the usual sterile fashion using ChloraPrep and a fenestrated drape.The levels were determined under fluoroscopy guidance. Skin anesthesia was achieved by injecting Lidocaine 2% over the injection sites. The  transforaminal spaces were then approached with a 22 gauge, 3.5 inch spinal quinke needle that was introduced under fluoroscopic guidance in the AP and Lateral views. Once the needle tip was in the area of the transforaminal space, and there was no blood, CSF or paraesthesias, contrast dye Omnipaque (300mg/mL) was injected to confirm placement and there was no vascular runoff. Fluoroscopic imaging in the AP and lateral views revealed a clear outline of the spinal nerve with proximal spread of agent through the neural foramen into the epidural space. 3 mL of the medication mixture listed above was injected slowly at each site. Displacement of the radio opaque contrast after injection of the medication confirmed that the medication went into the area of the transforaminal spaces. The needles were removed and bleeding was nil. A sterile dressing was applied. No specimens collected. The patient tolerated the procedure well.     PRE-PROCEDURE PAIN SCORE: 9/10    POST-PROCEDURE PAIN SCORE: 0/10    The patient was monitored after the procedure in the recovery area. They were given post-procedure and discharge instructions to follow at home. The patient was discharged in a stable condition.    Thom Simmons MD

## 2025-06-16 NOTE — DISCHARGE SUMMARY
Discharge Note  Short Stay      SUMMARY     Admit Date: 6/16/2025    Attending Physician: Thom Simmons      Discharge Physician: Thom Simmons      Discharge Date: 6/16/2025 11:02 AM    Procedure(s) (LRB):  LUMBAR TRANSFORAMINAL RIGHT S1 (Right)    Final Diagnosis: Chronic right-sided low back pain with right-sided sciatica [M54.41, G89.29]    Disposition: Home or self care    Patient Instructions:   Current Discharge Medication List        CONTINUE these medications which have NOT CHANGED    Details   meloxicam (MOBIC) 15 MG tablet Take 1 tablet (15 mg total) by mouth once daily.  Qty: 30 tablet, Refills: 2                 Discharge Diagnosis: Chronic right-sided low back pain with right-sided sciatica [M54.41, G89.29]  Condition on Discharge: Stable with no complications to procedure   Diet on Discharge: Same as before.  Activity: as per instruction sheet.  Discharge to: Home with a responsible adult.  Follow up: 2-4 weeks       Please call my office or pager at [phone] if experienced any weakness or loss of sensation, fever > 101.5, pain uncontrolled with oral medications, persistent nausea/vomiting/or diarrhea, redness or drainage from the incisions, or any other worrisome concerns. If physician on call was not reached or could not communicate with our office for any reason please go to the nearest emergency department

## 2025-06-16 NOTE — DISCHARGE INSTRUCTIONS

## 2025-07-03 ENCOUNTER — OFFICE VISIT (OUTPATIENT)
Dept: PAIN MEDICINE | Facility: CLINIC | Age: 40
End: 2025-07-03
Payer: COMMERCIAL

## 2025-07-03 VITALS
BODY MASS INDEX: 38.67 KG/M2 | HEIGHT: 72 IN | WEIGHT: 285.5 LBS | SYSTOLIC BLOOD PRESSURE: 131 MMHG | DIASTOLIC BLOOD PRESSURE: 81 MMHG | HEART RATE: 88 BPM

## 2025-07-03 DIAGNOSIS — M54.51 VERTEBROGENIC LOW BACK PAIN: ICD-10-CM

## 2025-07-03 DIAGNOSIS — M51.362 DEGENERATION OF INTERVERTEBRAL DISC OF LUMBAR REGION WITH DISCOGENIC BACK PAIN AND LOWER EXTREMITY PAIN: ICD-10-CM

## 2025-07-03 DIAGNOSIS — G89.29 OTHER CHRONIC PAIN: ICD-10-CM

## 2025-07-03 DIAGNOSIS — M54.16 LUMBAR RADICULOPATHY: Primary | ICD-10-CM

## 2025-07-03 DIAGNOSIS — M54.17 LUMBOSACRAL RADICULOPATHY: ICD-10-CM

## 2025-07-03 PROCEDURE — 1159F MED LIST DOCD IN RCRD: CPT | Mod: CPTII,S$GLB,,

## 2025-07-03 PROCEDURE — 99999 PR PBB SHADOW E&M-EST. PATIENT-LVL III: CPT | Mod: PBBFAC,,,

## 2025-07-03 PROCEDURE — 3075F SYST BP GE 130 - 139MM HG: CPT | Mod: CPTII,S$GLB,,

## 2025-07-03 PROCEDURE — 99214 OFFICE O/P EST MOD 30 MIN: CPT | Mod: S$GLB,,,

## 2025-07-03 PROCEDURE — 3079F DIAST BP 80-89 MM HG: CPT | Mod: CPTII,S$GLB,,

## 2025-07-03 PROCEDURE — 3008F BODY MASS INDEX DOCD: CPT | Mod: CPTII,S$GLB,,

## 2025-07-03 PROCEDURE — 1160F RVW MEDS BY RX/DR IN RCRD: CPT | Mod: CPTII,S$GLB,,

## 2025-07-03 NOTE — PROGRESS NOTES
Subjective:      Patient ID: Carlos Flores is a 40 y.o. male.    Chief Complaint: Follow-up    Referred by: No ref. provider found       Pain Disability Index Review:      7/3/2025     2:48 PM 6/2/2025    10:29 AM   Last 3 PDI Scores   Pain Disability Index (PDI) 1 60      Interval History 7/3/2025:  Carlos Flores returns for follow-up after Right S1 TFESI on 6/16/2025. He reports no pain relief. He continues with sharp shooting pain down the back of the right leg in an S1 distribution. He also has some pain to the right leg in an L5 distribution. The pain is worse with sitting, upon standing. He states that upon standing, he will have a sharp, shooting pain to the right leg for 5-10 seconds and then the pain dissipates. The pain is not worse while walking.He continues his home exercise program as previously learned in physical therapy a couple times per week. He denies recent health changes. He denies recent falls or trauma. He denies new onset fever/night sweats, urinary incontinence, bowel incontinence, significant weight changes, significant motor weakness or changes, or loss of sensations. His right leg pain today is 9/10.      Initial HPI 6/2/25:  Patient presents to clinic for evaluation of lower back pain. Patient was seen by Dr. Hagan in May 2020 who sent him for R S1 TFESI with Dr. Simmons on 6/18/20 which provided 100% relief for 5 years. He was periodically seen by Dr. Hagan, but she no longer works at [employer]. Shooting pain/burning starting in the lumbar spine shooting to back of knee for 1.5 weeks. Best day 5/10, worst 10/10, today 8/10. Pain is exacerbated by sitting. States that this pain is exactly like the pain he experienced in 2020, except for patient is able to get comfortable while laying flat. He has been doing his physical therapy exercises at home. No red flag back pain signs. No traumas. No inciting event.     Pain Interventions:  6/18/2020 - Right S1 TFESI (direct  referral)  6/16/2025 - Right S1 TFESI - no relief    Relevant Surgeries:  None    Conservative Treatment  - Physical Therpy: PT 5 years ago for 6 weeks  - Home Exercise Program: Yes  - Chiropractic: No    Medications  Blood thinners: No    Relevant Imaging  MRI LUMBAR SPINE WITHOUT CONTRAST 4/30/24     CLINICAL HISTORY:  Low back pain, symptoms persist with > 6wks conservative treatment; Dorsalgia, unspecified     TECHNIQUE:  Multiplanar, multisequence MR images were acquired from the thoracolumbar junction to the sacrum without the administration of contrast.     COMPARISON:  05/21/2020     FINDINGS:  The marrow demonstrates homogeneous signal.  Vertebral body heights are maintained.  Disc space narrowing and endplate changes L5-S1. Conus terminates appropriately at L1.     Multilevel degenerative change as diesel below:     T12-L1: No focal disc abnormality.  No significant canal or neural foraminal narrowing.     L1-2: No focal disc abnormality.  No significant canal or neural foraminal narrowing.     L2-3: Mild facet arthropathy.  No significant canal or neural foraminal narrowing.     L3-4: Mild facet arthropathy.  Mild left neural foraminal narrowing.     L4-5: Posterior circumferential disc bulge and facet arthropathy.  Moderate right and mild left neural foraminal narrowing.     L5-S1: Posterior circumferential disc bulge with annular tear.  Mild bilateral neural foraminal narrowing.     Impression:     Multilevel degenerative change, similar compared to prior.  Central canal well maintained at all lumbar levels.  Neural foraminal narrowing lower lumbar spine as given in detail above.      Past Medical History:   Diagnosis Date    Elevated blood pressure reading without diagnosis of hypertension 11/07/2019    Sciatica        Past Surgical History:   Procedure Laterality Date    INJECTION, SPINE, LUMBOSACRAL, TRANSFORAMINAL APPROACH Right 6/16/2025    Procedure: LUMBAR TRANSFORAMINAL RIGHT S1;  Surgeon:  Thom Simmons MD;  Location: [facility];  Service: Pain Management;  Laterality: Right;  2 WK F/U ENRIQUETA    MOUTH SURGERY      TRANSFORAMINAL EPIDURAL INJECTION OF STEROID Right 6/18/2020    Procedure: LUMBAR TRANSFORAMINAL RIGHT S1 DIRECT REFERRAL;  Surgeon: Thom Simmons MD;  Location: [facility];  Service: Pain Management;  Laterality: Right;  NEEDS CONSENT       Review of patient's allergies indicates:  No Known Allergies    Current Medications[1]    Family History   Problem Relation Name Age of Onset    Diabetes Father      Cancer Maternal Grandmother      Colon cancer Paternal Grandfather         Social History[2]        Review of Systems   Constitutional: Negative for fever, weight gain and weight loss.   Musculoskeletal:  Positive for back pain. Negative for falls, muscle weakness and stiffness.   Gastrointestinal:  Negative for abdominal pain, bowel incontinence and diarrhea.   Genitourinary:  Negative for bladder incontinence.           Objective:   /81 (BP Location: Left arm, Patient Position: Sitting)   Pulse 88   Ht 6' (1.829 m)   Wt 129.5 kg (285 lb 7.9 oz)   BMI 38.72 kg/m²       PHYSICAL EXAM        GENERAL APPEARANCE: Well appearing, in no acute distress.  PSYCH:  Mood and affect appropriate.  SKIN: Skin color, texture, turgor normal, no rashes or lesions to visible areas.  HEAD/FACE:  Normocephalic, atraumatic.  NECK: No pain to palpation over the cervical paraspinous muscles. Spurling Negative. No pain with neck flexion, extension, or lateral flexion.   CARDIO: Rate regular.  No lower extremity edema. Capillary refill <2 seconds.   PULM: Bilateral chest rise. No apparent respiratory distress.   GI:  Non-distended  BACK: Straight leg raising in the sitting position is positive to radicular pain on the right. No pain to palpation over the spine or costovertebral angles. Normal range of motion without pain reproduction. No pain to palpation to bilateral SI joints.   EXTREMITIES:  Peripheral joint ROM is full and pain free without obvious instability or laxity in all four extremities. No deformities, edema, or skin discoloration.   MUSCULOSKELETAL: Hip provocative maneuvers are negative. No pain to palpation to bilateral pirifromis. No pain to palpation to bilateral GTB. Bilateral upper and lower extremity strength is normal and symmetric.  No atrophy or tone abnormalities are noted.  NEURO: Bilateral upper and lower extremity coordination and muscle stretch reflexes are physiologic and symmetric.  Gomez's absent. Plantar response are downgoing. No clonus noted. No loss of sensation is noted.  GAIT: normal.       Assessment:     40-year-old male with history of right-sided lumbar radiculopathy presenting for right lower back pain radiating to the knee.  Based on physical exam and description of symptoms, most consistent with lumbar radiculopathy.  No red flags to suggest emergent spinal cord pathology.  MRI done 4/30 that shows moderate right-sided foraminal narrowing at L4/L5 and mild bilateral neural foraminal narrowing at L5/S1.  Patient had 100% relief of symptoms for 5 years after last right S1 transforaminal epidural injection, will repeat and assess for response.    Encounter Diagnoses   Name Primary?    Lumbar radiculopathy Yes    Lumbosacral radiculopathy     Degeneration of intervertebral disc of lumbar region with discogenic back pain and lower extremity pain     Other chronic pain     Vertebrogenic low back pain            Plan:   We discussed with the patient the assessment and recommendations. The following is the plan we agreed on:      Previous imaging was reviewed and discussed with the patient today.  He is s/p right S1 TFESI with limited relief.   Procedure order placed for right L4/5 and L5/S1 TFESI for lumbar radiculopathy and discogenic lower extremity pain.   Continue ibuprofen or aleve as needed considering benefit.   Counseled patient regarding the importance of  physical therapy.  Modic changes seen on 5/2024 MRI at L5/S1-consider updating MRI and planning Intracept if no relief with [place] for vertebrogenic lower back pain.   RTC 2- weeks after above procedure.     The above plan and management options were discussed at length with patient. Patient is in agreement with the above and verbalized understanding.     Cindi Moralez NP  07/03/2025             [1]   Current Outpatient Medications   Medication Sig Dispense Refill    meloxicam (MOBIC) 15 MG tablet Take 1 tablet (15 mg total) by mouth once daily. 30 tablet 2     No current facility-administered medications for this visit.   [2]   Social History  Socioeconomic History    Marital status:    Tobacco Use    Smoking status: Never    Smokeless tobacco: Never   Substance and Sexual Activity    Alcohol use: Yes     Comment: occ    Drug use: Never     Comment: denies

## 2025-07-03 NOTE — H&P (VIEW-ONLY)
Subjective:      Patient ID: Carlos Flores is a 40 y.o. male.    Chief Complaint: Follow-up    Referred by: No ref. provider found       Pain Disability Index Review:      7/3/2025     2:48 PM 6/2/2025    10:29 AM   Last 3 PDI Scores   Pain Disability Index (PDI) 1 60      Interval History 7/3/2025:  Carlos Flores returns for follow-up after Right S1 TFESI on 6/16/2025. He reports no pain relief. He continues with sharp shooting pain down the back of the right leg in an S1 distribution. He also has some pain to the right leg in an L5 distribution. The pain is worse with sitting, upon standing. He states that upon standing, he will have a sharp, shooting pain to the right leg for 5-10 seconds and then the pain dissipates. The pain is not worse while walking.He continues his home exercise program as previously learned in physical therapy a couple times per week. He denies recent health changes. He denies recent falls or trauma. He denies new onset fever/night sweats, urinary incontinence, bowel incontinence, significant weight changes, significant motor weakness or changes, or loss of sensations. His right leg pain today is 9/10.      Initial HPI 6/2/25:  Patient presents to clinic for evaluation of lower back pain. Patient was seen by Dr. Hagan in May 2020 who sent him for R S1 TFESI with Dr. Simmons on 6/18/20 which provided 100% relief for 5 years. He was periodically seen by Dr. Hagan, but she no longer works at [employer]. Shooting pain/burning starting in the lumbar spine shooting to back of knee for 1.5 weeks. Best day 5/10, worst 10/10, today 8/10. Pain is exacerbated by sitting. States that this pain is exactly like the pain he experienced in 2020, except for patient is able to get comfortable while laying flat. He has been doing his physical therapy exercises at home. No red flag back pain signs. No traumas. No inciting event.     Pain Interventions:  6/18/2020 - Right S1 TFESI (direct  referral)  6/16/2025 - Right S1 TFESI - no relief    Relevant Surgeries:  None    Conservative Treatment  - Physical Therpy: PT 5 years ago for 6 weeks  - Home Exercise Program: Yes  - Chiropractic: No    Medications  Blood thinners: No    Relevant Imaging  MRI LUMBAR SPINE WITHOUT CONTRAST 4/30/24     CLINICAL HISTORY:  Low back pain, symptoms persist with > 6wks conservative treatment; Dorsalgia, unspecified     TECHNIQUE:  Multiplanar, multisequence MR images were acquired from the thoracolumbar junction to the sacrum without the administration of contrast.     COMPARISON:  05/21/2020     FINDINGS:  The marrow demonstrates homogeneous signal.  Vertebral body heights are maintained.  Disc space narrowing and endplate changes L5-S1. Conus terminates appropriately at L1.     Multilevel degenerative change as diesel below:     T12-L1: No focal disc abnormality.  No significant canal or neural foraminal narrowing.     L1-2: No focal disc abnormality.  No significant canal or neural foraminal narrowing.     L2-3: Mild facet arthropathy.  No significant canal or neural foraminal narrowing.     L3-4: Mild facet arthropathy.  Mild left neural foraminal narrowing.     L4-5: Posterior circumferential disc bulge and facet arthropathy.  Moderate right and mild left neural foraminal narrowing.     L5-S1: Posterior circumferential disc bulge with annular tear.  Mild bilateral neural foraminal narrowing.     Impression:     Multilevel degenerative change, similar compared to prior.  Central canal well maintained at all lumbar levels.  Neural foraminal narrowing lower lumbar spine as given in detail above.      Past Medical History:   Diagnosis Date    Elevated blood pressure reading without diagnosis of hypertension 11/07/2019    Sciatica        Past Surgical History:   Procedure Laterality Date    INJECTION, SPINE, LUMBOSACRAL, TRANSFORAMINAL APPROACH Right 6/16/2025    Procedure: LUMBAR TRANSFORAMINAL RIGHT S1;  Surgeon:  Thom Simmons MD;  Location: [facility];  Service: Pain Management;  Laterality: Right;  2 WK F/U [facility]RIQEUTA    MOUTH SURGERY      TRANSFORAMINAL EPIDURAL INJECTION OF STEROID Right 6/18/2020    Procedure: LUMBAR TRANSFORAMINAL RIGHT S1 DIRECT REFERRAL;  Surgeon: Thom Simmons MD;  Location: [facility];  Service: Pain Management;  Laterality: Right;  NEEDS CONSENT       Review of patient's allergies indicates:  No Known Allergies    Current Medications[1]    Family History   Problem Relation Name Age of Onset    Diabetes Father      Cancer Maternal Grandmother      Colon cancer Paternal Grandfather         Social History[2]        Review of Systems   Constitutional: Negative for fever, weight gain and weight loss.   Musculoskeletal:  Positive for back pain. Negative for falls, muscle weakness and stiffness.   Gastrointestinal:  Negative for abdominal pain, bowel incontinence and diarrhea.   Genitourinary:  Negative for bladder incontinence.           Objective:   /81 (BP Location: Left arm, Patient Position: Sitting)   Pulse 88   Ht 6' (1.829 m)   Wt 129.5 kg (285 lb 7.9 oz)   BMI 38.72 kg/m²       PHYSICAL EXAM        GENERAL APPEARANCE: Well appearing, in no acute distress.  PSYCH:  Mood and affect appropriate.  SKIN: Skin color, texture, turgor normal, no rashes or lesions to visible areas.  HEAD/FACE:  Normocephalic, atraumatic.  NECK: No pain to palpation over the cervical paraspinous muscles. Spurling Negative. No pain with neck flexion, extension, or lateral flexion.   CARDIO: Rate regular.  No lower extremity edema. Capillary refill <2 seconds.   PULM: Bilateral chest rise. No apparent respiratory distress.   GI:  Non-distended  BACK: Straight leg raising in the sitting position is positive to radicular pain on the right. No pain to palpation over the spine or costovertebral angles. Normal range of motion without pain reproduction. No pain to palpation to bilateral SI joints.   EXTREMITIES:  Peripheral joint ROM is full and pain free without obvious instability or laxity in all four extremities. No deformities, edema, or skin discoloration.   MUSCULOSKELETAL: Hip provocative maneuvers are negative. No pain to palpation to bilateral pirifromis. No pain to palpation to bilateral GTB. Bilateral upper and lower extremity strength is normal and symmetric.  No atrophy or tone abnormalities are noted.  NEURO: Bilateral upper and lower extremity coordination and muscle stretch reflexes are physiologic and symmetric.  Gomez's absent. Plantar response are downgoing. No clonus noted. No loss of sensation is noted.  GAIT: normal.       Assessment:     40-year-old male with history of right-sided lumbar radiculopathy presenting for right lower back pain radiating to the knee.  Based on physical exam and description of symptoms, most consistent with lumbar radiculopathy.  No red flags to suggest emergent spinal cord pathology.  MRI done 4/30 that shows moderate right-sided foraminal narrowing at L4/L5 and mild bilateral neural foraminal narrowing at L5/S1.  Patient had 100% relief of symptoms for 5 years after last right S1 transforaminal epidural injection, will repeat and assess for response.    Encounter Diagnoses   Name Primary?    Lumbar radiculopathy Yes    Lumbosacral radiculopathy     Degeneration of intervertebral disc of lumbar region with discogenic back pain and lower extremity pain     Other chronic pain     Vertebrogenic low back pain            Plan:   We discussed with the patient the assessment and recommendations. The following is the plan we agreed on:      Previous imaging was reviewed and discussed with the patient today.  He is s/p right S1 TFESI with limited relief.   Procedure order placed for right L4/5 and L5/S1 TFESI for lumbar radiculopathy and discogenic lower extremity pain.   Continue ibuprofen or aleve as needed considering benefit.   Counseled patient regarding the importance of  physical therapy.  Modic changes seen on 5/2024 MRI at L5/S1-consider updating MRI and planning Intracept if no relief with [place] for vertebrogenic lower back pain.   RTC 2- weeks after above procedure.     The above plan and management options were discussed at length with patient. Patient is in agreement with the above and verbalized understanding.     Cindi Moralez NP  07/03/2025             [1]   Current Outpatient Medications   Medication Sig Dispense Refill    meloxicam (MOBIC) 15 MG tablet Take 1 tablet (15 mg total) by mouth once daily. 30 tablet 2     No current facility-administered medications for this visit.   [2]   Social History  Socioeconomic History    Marital status:    Tobacco Use    Smoking status: Never    Smokeless tobacco: Never   Substance and Sexual Activity    Alcohol use: Yes     Comment: occ    Drug use: Never     Comment: denies

## 2025-07-19 ENCOUNTER — PATIENT MESSAGE (OUTPATIENT)
Dept: PAIN MEDICINE | Facility: CLINIC | Age: 40
End: 2025-07-19
Payer: COMMERCIAL

## 2025-07-19 DIAGNOSIS — M51.362 DEGENERATION OF INTERVERTEBRAL DISC OF LUMBAR REGION WITH DISCOGENIC BACK PAIN AND LOWER EXTREMITY PAIN: Primary | ICD-10-CM

## 2025-07-19 DIAGNOSIS — M54.51 VERTEBROGENIC LOW BACK PAIN: ICD-10-CM

## 2025-07-21 RX ORDER — DICLOFENAC SODIUM 50 MG/1
50 TABLET, DELAYED RELEASE ORAL 2 TIMES DAILY
Qty: 40 TABLET | Refills: 0 | Status: SHIPPED | OUTPATIENT
Start: 2025-07-21

## 2025-07-28 ENCOUNTER — HOSPITAL ENCOUNTER (OUTPATIENT)
Facility: OTHER | Age: 40
Discharge: HOME | End: 2025-07-28
Attending: STUDENT IN AN ORGANIZED HEALTH CARE EDUCATION/TRAINING PROGRAM | Admitting: STUDENT IN AN ORGANIZED HEALTH CARE EDUCATION/TRAINING PROGRAM
Payer: COMMERCIAL

## 2025-07-28 VITALS
TEMPERATURE: 99 F | HEIGHT: 72 IN | OXYGEN SATURATION: 99 % | RESPIRATION RATE: 17 BRPM | BODY MASS INDEX: 37.93 KG/M2 | SYSTOLIC BLOOD PRESSURE: 100 MMHG | HEART RATE: 79 BPM | DIASTOLIC BLOOD PRESSURE: 58 MMHG | WEIGHT: 280 LBS

## 2025-07-28 DIAGNOSIS — G89.29 CHRONIC PAIN: ICD-10-CM

## 2025-07-28 DIAGNOSIS — M54.16 LUMBAR RADICULOPATHY: Primary | ICD-10-CM

## 2025-07-28 PROCEDURE — 99152 MOD SED SAME PHYS/QHP 5/>YRS: CPT | Mod: ,,, | Performed by: STUDENT IN AN ORGANIZED HEALTH CARE EDUCATION/TRAINING PROGRAM

## 2025-07-28 PROCEDURE — 64484 NJX AA&/STRD TFRM EPI L/S EA: CPT | Mod: RT,,, | Performed by: STUDENT IN AN ORGANIZED HEALTH CARE EDUCATION/TRAINING PROGRAM

## 2025-07-28 PROCEDURE — 25500020 PHARM REV CODE 255: Performed by: STUDENT IN AN ORGANIZED HEALTH CARE EDUCATION/TRAINING PROGRAM

## 2025-07-28 PROCEDURE — 64484 NJX AA&/STRD TFRM EPI L/S EA: CPT | Mod: RT | Performed by: STUDENT IN AN ORGANIZED HEALTH CARE EDUCATION/TRAINING PROGRAM

## 2025-07-28 PROCEDURE — 64483 NJX AA&/STRD TFRM EPI L/S 1: CPT | Mod: RT,,, | Performed by: STUDENT IN AN ORGANIZED HEALTH CARE EDUCATION/TRAINING PROGRAM

## 2025-07-28 PROCEDURE — 64483 NJX AA&/STRD TFRM EPI L/S 1: CPT | Mod: RT | Performed by: STUDENT IN AN ORGANIZED HEALTH CARE EDUCATION/TRAINING PROGRAM

## 2025-07-28 PROCEDURE — 99152 MOD SED SAME PHYS/QHP 5/>YRS: CPT | Performed by: STUDENT IN AN ORGANIZED HEALTH CARE EDUCATION/TRAINING PROGRAM

## 2025-07-28 PROCEDURE — 63600175 PHARM REV CODE 636 W HCPCS: Performed by: STUDENT IN AN ORGANIZED HEALTH CARE EDUCATION/TRAINING PROGRAM

## 2025-07-28 RX ORDER — SODIUM CHLORIDE 9 MG/ML
INJECTION, SOLUTION INTRAVENOUS CONTINUOUS
Status: DISCONTINUED | OUTPATIENT
Start: 2025-07-28 | End: 2025-07-28 | Stop reason: HOSPADM

## 2025-07-28 RX ORDER — FENTANYL CITRATE 50 UG/ML
INJECTION, SOLUTION INTRAMUSCULAR; INTRAVENOUS
Status: DISCONTINUED | OUTPATIENT
Start: 2025-07-28 | End: 2025-07-28 | Stop reason: HOSPADM

## 2025-07-28 RX ORDER — MIDAZOLAM HYDROCHLORIDE 1 MG/ML
INJECTION INTRAMUSCULAR; INTRAVENOUS
Status: DISCONTINUED | OUTPATIENT
Start: 2025-07-28 | End: 2025-07-28 | Stop reason: HOSPADM

## 2025-07-28 RX ORDER — LIDOCAINE HYDROCHLORIDE 20 MG/ML
INJECTION, SOLUTION INFILTRATION; PERINEURAL
Status: DISCONTINUED | OUTPATIENT
Start: 2025-07-28 | End: 2025-07-28 | Stop reason: HOSPADM

## 2025-07-28 RX ORDER — DEXAMETHASONE SODIUM PHOSPHATE 10 MG/ML
INJECTION, SOLUTION INTRA-ARTICULAR; INTRALESIONAL; INTRAMUSCULAR; INTRAVENOUS; SOFT TISSUE
Status: DISCONTINUED | OUTPATIENT
Start: 2025-07-28 | End: 2025-07-28 | Stop reason: HOSPADM

## 2025-07-28 RX ORDER — LIDOCAINE HYDROCHLORIDE 10 MG/ML
INJECTION, SOLUTION EPIDURAL; INFILTRATION; INTRACAUDAL; PERINEURAL
Status: DISCONTINUED | OUTPATIENT
Start: 2025-07-28 | End: 2025-07-28 | Stop reason: HOSPADM

## 2025-07-28 NOTE — OP NOTE
Lumbar Transforaminal Epidural Steroid Injection under Fluoroscopic Guidance    The procedure, risks, benefits, and options were discussed with the patient. There are no contraindications to the procedure. The patent expressed understanding and agreed to the procedure. Informed written consent was obtained prior to the start of the procedure and can be found in the patient's chart.    PATIENT NAME: Carlos Flores   MRN: [id number]     DATE OF PROCEDURE: 07/28/2025    PROCEDURE:  Right  L4/5 and L5/S1 Lumbar Transforaminal Epidural Steroid Injection under Fluoroscopic Guidance    PRE-OP DIAGNOSIS: Lumbar radiculopathy [M54.16]  Lumbosacral radiculopathy [M54.17]  Degeneration of intervertebral disc of lumbar region with discogenic back pain and lower extremity pain [M51.362] Lumbar radiculopathy [M54.16]    POST-OP DIAGNOSIS: Same    PHYSICIAN: Gustavo yDer MD    ASSISTANTS: Olaf Nunez MD     MEDICATIONS INJECTED: Preservative-free Decadron 10mg with 5cc of Lidocaine 1% MPF     LOCAL ANESTHETIC INJECTED: Xylocaine 2%     SEDATION: Versed 2mg and Fentanyl 50mcg                                                                                                                                                                                     Conscious sedation ordered by M.D. Patient re-evaluation prior to administration of conscious sedation. No changes noted in patient's status from initial evaluation. The patient's vital signs were monitored by RN and patient remained hemodynamically stable throughout the procedure.    Event Time In   Sedation Start 1115   Sedation End 1129       ESTIMATED BLOOD LOSS: None    COMPLICATIONS: None    TECHNIQUE: Time-out was performed to identify the patient and procedure to be performed. With the patient laying in a prone position, the surgical area was prepped and draped in the usual sterile fashion using ChloraPrep and a fenestrated drape.The levels were determined under fluoroscopy  guidance. Skin anesthesia was achieved by injecting Lidocaine 2% over the injection sites. The transforaminal spaces were then approached with a 22 gauge, 5 inch spinal quinke needle that was introduced under fluoroscopic guidance in the AP and Lateral views. Once the needle tip was in the area of the transforaminal space, and there was no blood, CSF or paraesthesias, contrast dye Omnipaque (300mg/mL) was injected to confirm placement and there was no vascular runoff. Fluoroscopic imaging in the AP and lateral views revealed a clear outline of the spinal nerve with proximal spread of agent through the neural foramen into the epidural space. 3 mL of the medication mixture listed above was injected slowly at each site. Displacement of the radio opaque contrast after injection of the medication confirmed that the medication went into the area of the transforaminal spaces. The needles were removed and bleeding was nil. A sterile dressing was applied. No specimens collected. The patient tolerated the procedure well.     The patient was monitored after the procedure in the recovery area. They were given post-procedure and discharge instructions to follow at home. The patient was discharged in a stable condition.    Gustavo Dyer MD

## 2025-07-28 NOTE — DISCHARGE SUMMARY
Discharge Note  Short Stay      SUMMARY     Admit Date: 7/28/2025    Attending Physician: Gustavo Dyer MD PhD    Discharge Physician: Gustavo Dyer      Discharge Date: 7/28/2025 11:30 AM    Procedure(s) (LRB):  INJECTION,  TRANSFORAMINAL  Right L4/5 and L5/S1 *EISSA PT* (Right)    Final Diagnosis: Lumbar radiculopathy [M54.16]  Lumbosacral radiculopathy [M54.17]  Degeneration of intervertebral disc of lumbar region with discogenic back pain and lower extremity pain [M51.362]    Disposition: Home or self care    Patient Instructions:   Current Discharge Medication List        CONTINUE these medications which have NOT CHANGED    Details   diclofenac (VOLTAREN) 50 MG EC tablet Take 1 tablet (50 mg total) by mouth 2 (two) times daily.  Qty: 40 tablet, Refills: 0    Associated Diagnoses: Degeneration of intervertebral disc of lumbar region with discogenic back pain and lower extremity pain; Vertebrogenic low back pain                 Discharge Diagnosis: Lumbar radiculopathy [M54.16]  Lumbosacral radiculopathy [M54.17]  Degeneration of intervertebral disc of lumbar region with discogenic back pain and lower extremity pain [M51.362]  Condition on Discharge: Stable with no complications to procedure   Diet on Discharge: Same as before.  Activity: as per instruction sheet.  Discharge to: Home with a responsible adult.  Follow up: 2-4 weeks       Please call my office or pager at [phone] if experienced any weakness or loss of sensation, fever > 101.5, pain uncontrolled with oral medications, persistent nausea/vomiting/or diarrhea, redness or drainage from the incisions, or any other worrisome concerns. If physician on call was not reached or could not communicate with our office for any reason please go to the nearest emergency department      Gustavo Dyer MD PhD  
13

## 2025-07-28 NOTE — DISCHARGE INSTRUCTIONS

## 2025-08-11 ENCOUNTER — OFFICE VISIT (OUTPATIENT)
Dept: PAIN MEDICINE | Facility: CLINIC | Age: 40
End: 2025-08-11
Payer: COMMERCIAL

## 2025-08-11 VITALS
WEIGHT: 280.19 LBS | SYSTOLIC BLOOD PRESSURE: 139 MMHG | TEMPERATURE: 99 F | DIASTOLIC BLOOD PRESSURE: 69 MMHG | HEIGHT: 72 IN | OXYGEN SATURATION: 96 % | BODY MASS INDEX: 37.95 KG/M2 | HEART RATE: 98 BPM

## 2025-08-11 DIAGNOSIS — M51.362 DEGENERATION OF INTERVERTEBRAL DISC OF LUMBAR REGION WITH DISCOGENIC BACK PAIN AND LOWER EXTREMITY PAIN: Primary | ICD-10-CM

## 2025-08-11 DIAGNOSIS — M54.9 DORSALGIA, UNSPECIFIED: ICD-10-CM

## 2025-08-11 DIAGNOSIS — M54.16 LUMBAR RADICULOPATHY: ICD-10-CM

## 2025-08-11 DIAGNOSIS — G89.29 OTHER CHRONIC PAIN: ICD-10-CM

## 2025-08-11 DIAGNOSIS — M54.16 LUMBAR RADICULOPATHY, CHRONIC: ICD-10-CM

## 2025-08-11 DIAGNOSIS — M54.51 VERTEBROGENIC LOW BACK PAIN: ICD-10-CM

## 2025-08-11 DIAGNOSIS — M54.17 LUMBOSACRAL RADICULOPATHY: ICD-10-CM

## 2025-08-11 PROCEDURE — 99214 OFFICE O/P EST MOD 30 MIN: CPT | Mod: S$GLB,,,

## 2025-08-11 PROCEDURE — 1160F RVW MEDS BY RX/DR IN RCRD: CPT | Mod: CPTII,S$GLB,,

## 2025-08-11 PROCEDURE — 3008F BODY MASS INDEX DOCD: CPT | Mod: CPTII,S$GLB,,

## 2025-08-11 PROCEDURE — 1159F MED LIST DOCD IN RCRD: CPT | Mod: CPTII,S$GLB,,

## 2025-08-11 PROCEDURE — 3078F DIAST BP <80 MM HG: CPT | Mod: CPTII,S$GLB,,

## 2025-08-11 PROCEDURE — 99999 PR PBB SHADOW E&M-EST. PATIENT-LVL IV: CPT | Mod: PBBFAC,,,

## 2025-08-11 PROCEDURE — 3075F SYST BP GE 130 - 139MM HG: CPT | Mod: CPTII,S$GLB,,

## 2025-08-11 RX ORDER — IBUPROFEN 600 MG/1
600 TABLET, FILM COATED ORAL EVERY 8 HOURS PRN
Qty: 90 TABLET | Refills: 0 | Status: SHIPPED | OUTPATIENT
Start: 2025-08-11

## 2025-08-11 RX ORDER — GABAPENTIN 300 MG/1
CAPSULE ORAL
Qty: 90 CAPSULE | Refills: 0 | Status: SHIPPED | OUTPATIENT
Start: 2025-08-11

## 2025-08-18 ENCOUNTER — HOSPITAL ENCOUNTER (OUTPATIENT)
Dept: RADIOLOGY | Facility: OTHER | Age: 40
Discharge: HOME | End: 2025-08-18
Payer: COMMERCIAL

## 2025-08-18 DIAGNOSIS — M54.16 LUMBAR RADICULOPATHY, CHRONIC: ICD-10-CM

## 2025-08-18 DIAGNOSIS — M54.9 DORSALGIA, UNSPECIFIED: ICD-10-CM

## 2025-08-18 PROCEDURE — 72148 MRI LUMBAR SPINE W/O DYE: CPT | Mod: TC

## 2025-08-18 PROCEDURE — 72148 MRI LUMBAR SPINE W/O DYE: CPT | Mod: 26,,, | Performed by: RADIOLOGY

## 2025-08-21 ENCOUNTER — PATIENT MESSAGE (OUTPATIENT)
Dept: PAIN MEDICINE | Facility: CLINIC | Age: 40
End: 2025-08-21
Payer: COMMERCIAL

## 2025-08-21 ENCOUNTER — TELEPHONE (OUTPATIENT)
Dept: PAIN MEDICINE | Facility: CLINIC | Age: 40
End: 2025-08-21
Payer: COMMERCIAL

## 2025-08-21 DIAGNOSIS — M54.17 LUMBOSACRAL RADICULOPATHY: Primary | ICD-10-CM

## 2025-08-21 DIAGNOSIS — M51.362 DEGENERATION OF INTERVERTEBRAL DISC OF LUMBAR REGION WITH DISCOGENIC BACK PAIN AND LOWER EXTREMITY PAIN: ICD-10-CM

## 2025-08-21 DIAGNOSIS — M54.16 LUMBAR RADICULOPATHY: ICD-10-CM

## 2025-08-25 DIAGNOSIS — M54.16 LUMBAR RADICULOPATHY: Primary | ICD-10-CM

## (undated) DEVICE — DRESSING LEUKOPLAST FLEX 1X3IN